# Patient Record
Sex: MALE | Race: OTHER | Employment: UNEMPLOYED | URBAN - METROPOLITAN AREA
[De-identification: names, ages, dates, MRNs, and addresses within clinical notes are randomized per-mention and may not be internally consistent; named-entity substitution may affect disease eponyms.]

---

## 2019-04-11 ENCOUNTER — OFFICE VISIT (OUTPATIENT)
Dept: FAMILY MEDICINE CLINIC | Age: 34
End: 2019-04-11
Payer: COMMERCIAL

## 2019-04-11 VITALS
BODY MASS INDEX: 30.69 KG/M2 | HEART RATE: 78 BPM | SYSTOLIC BLOOD PRESSURE: 118 MMHG | DIASTOLIC BLOOD PRESSURE: 80 MMHG | HEIGHT: 71 IN | WEIGHT: 219.2 LBS

## 2019-04-11 DIAGNOSIS — R10.11 CHRONIC RUQ PAIN: Primary | ICD-10-CM

## 2019-04-11 DIAGNOSIS — E78.1 HYPERTRIGLYCERIDEMIA: ICD-10-CM

## 2019-04-11 DIAGNOSIS — G89.29 CHRONIC RUQ PAIN: Primary | ICD-10-CM

## 2019-04-11 PROCEDURE — G8417 CALC BMI ABV UP PARAM F/U: HCPCS | Performed by: FAMILY MEDICINE

## 2019-04-11 PROCEDURE — 1036F TOBACCO NON-USER: CPT | Performed by: FAMILY MEDICINE

## 2019-04-11 PROCEDURE — 99213 OFFICE O/P EST LOW 20 MIN: CPT | Performed by: FAMILY MEDICINE

## 2019-04-11 PROCEDURE — G8427 DOCREV CUR MEDS BY ELIG CLIN: HCPCS | Performed by: FAMILY MEDICINE

## 2019-04-11 ASSESSMENT — ENCOUNTER SYMPTOMS
DIARRHEA: 0
RESPIRATORY NEGATIVE: 1
CONSTIPATION: 0
BLOOD IN STOOL: 0
ABDOMINAL PAIN: 1

## 2019-04-11 ASSESSMENT — PATIENT HEALTH QUESTIONNAIRE - PHQ9
SUM OF ALL RESPONSES TO PHQ9 QUESTIONS 1 & 2: 2
1. LITTLE INTEREST OR PLEASURE IN DOING THINGS: 1
SUM OF ALL RESPONSES TO PHQ QUESTIONS 1-9: 2
2. FEELING DOWN, DEPRESSED OR HOPELESS: 1
SUM OF ALL RESPONSES TO PHQ QUESTIONS 1-9: 2

## 2019-04-11 NOTE — PROGRESS NOTES
Patient ID: Jeannette Enamorado 1985    . Chief Complaint   Patient presents with    Abdominal Pain     RUQ 8/10    Blood Sugar Problem     was told pre diabetes in 2016         Abdominal Pain   This is a chronic problem. The current episode started more than 1 year ago. The onset quality is gradual. The problem occurs every several days. The problem has been gradually worsening. The pain is located in the RUQ. The pain is moderate. The abdominal pain does not radiate. Pertinent negatives include no constipation or diarrhea. Exacerbated by: twisting to left --does this a lot at work. Treatments tried: went to ER and had negative work up. Prediabetes: here for f/u. Review of Systems   Respiratory: Negative. Gastrointestinal: Positive for abdominal pain. Negative for blood in stool, constipation and diarrhea. Patient Active Problem List   Diagnosis    High triglycerides       No past medical history on file. No past surgical history on file. Family History   Problem Relation Age of Onset    High Cholesterol Father     Hypertension Father     Seizures Father     Cancer Other     Diabetes Other     High Cholesterol Other     Hypertension Other        No current outpatient medications on file prior to visit. No current facility-administered medications on file prior to visit. Objective:     Physical Exam   Constitutional: He appears well-developed and well-nourished. No distress. HENT:   Head: Normocephalic and atraumatic. Right Ear: Hearing, tympanic membrane and external ear normal.   Left Ear: Hearing, tympanic membrane and external ear normal.   Mouth/Throat: Oropharynx is clear and moist and mucous membranes are normal. No oropharyngeal exudate, posterior oropharyngeal edema, posterior oropharyngeal erythema or tonsillar abscesses. Neck: No tracheal deviation present. No thyromegaly present.    Cardiovascular: Normal rate, regular rhythm, S1 normal, S2 normal and normal heart sounds. Exam reveals no gallop. No murmur heard. Pulmonary/Chest: Effort normal and breath sounds normal. No respiratory distress. He has no wheezes. He has no rales. Abdominal: Soft. He exhibits no mass. There is no tenderness. Musculoskeletal: He exhibits no edema. Lymphadenopathy:        Head (right side): No submental, no submandibular and no posterior auricular adenopathy present. Head (left side): No submental, no submandibular and no posterior auricular adenopathy present. Right cervical: No superficial cervical, no deep cervical and no posterior cervical adenopathy present. Left cervical: No superficial cervical, no deep cervical and no posterior cervical adenopathy present. Neurological: He is alert. Skin: Skin is warm, dry and intact. Psychiatric: He has a normal mood and affect. His speech is normal and behavior is normal. Cognition and memory are normal.   Nursing note and vitals reviewed. Vitals:    04/11/19 1042   BP: 118/80   Site: Right Upper Arm   Position: Sitting   Cuff Size: Large Adult   Pulse: 78   Weight: 219 lb 3.2 oz (99.4 kg)   Height: 5' 11\" (1.803 m)     Body mass index is 30.57 kg/m². Wt Readings from Last 3 Encounters:   04/11/19 219 lb 3.2 oz (99.4 kg)   11/02/16 225 lb (102.1 kg)   10/25/16 224 lb 3.2 oz (101.7 kg)     BP Readings from Last 3 Encounters:   04/11/19 118/80   11/02/16 133/89   10/25/16 126/84          No results found for this visit on 04/11/19. The ASCVD Risk score (Hannah Smart et al., 2013) failed to calculate for the following reasons: The 2013 ASCVD risk score is only valid for ages 36 to 78  Lab Review   No visits with results within 6 Month(s) from this visit.    Latest known visit with results is:   Admission on 11/02/2016, Discharged on 11/02/2016   Component Date Value    WBC 11/02/2016 8.5     RBC 11/02/2016 5.41     Hemoglobin 11/02/2016 15.9     Hematocrit 11/02/2016 45.6     MCV 11/02/2016 84.3     MCH 11/02/2016 29.4     MCHC 11/02/2016 34.9     RDW 11/02/2016 12.2     Platelets 09/60/4620 191     MPV 11/02/2016 11.0     Differential Type 11/02/2016 AUTOMATED DIFFERENTIAL     Segs Relative 11/02/2016 61.7     Lymphocytes % 11/02/2016 29.9     Monocytes % 11/02/2016 6.9*    Eosinophils % 11/02/2016 0.7     Basophils % 11/02/2016 0.4     Segs Absolute 11/02/2016 5.3     Lymphocytes # 11/02/2016 2.6     Monocytes # 11/02/2016 0.6     Eosinophils # 11/02/2016 0.1     Basophils # 11/02/2016 0.0     Immature Neutrophil % 11/02/2016 0.4     Total Immature Neutrophil 11/02/2016 0.03     Sodium 11/02/2016 140     Potassium 11/02/2016 4.1     Chloride 11/02/2016 102     CO2 11/02/2016 27     BUN 11/02/2016 13     CREATININE 11/02/2016 1.0     Glucose, Fasting 11/02/2016 87     Calcium 11/02/2016 9.8     Alb 11/02/2016 4.8     Total Protein 11/02/2016 7.5     Total Bilirubin 11/02/2016 0.6     ALT 11/02/2016 83*    AST 11/02/2016 35     Alkaline Phosphatase 11/02/2016 116     GFR Non- 11/02/2016 >60     GFR  11/02/2016 >60     Anion Gap 11/02/2016 11     Lipase 11/02/2016 40     Color, UA 11/02/2016 YELLOW     Clarity, UA 11/02/2016 CLEAR     Glucose, Urine 11/02/2016 NEGATIVE     Bilirubin Urine 11/02/2016 NEGATIVE     Ketones, Urine 11/02/2016 NEGATIVE     Specific [place], UA 11/02/2016 1.021     Blood, Urine 11/02/2016 NEGATIVE     pH, Urine 11/02/2016 6.0     Protein, UA 11/02/2016 TRACE*    Urobilinogen, Urine 11/02/2016 0.2     Nitrite Urine, Quant[place]* 11/02/2016 NEGATIVE     Leukocyte Esterase, Urine 11/02/2016 NEGATIVE     Volume, (UVOL) 11/02/2016 12     RBC, UA 11/02/2016 0 TO 1     WBC, UA 11/02/2016 NO CELLS SEEN     Epi Cells 11/02/2016 0 TO 1     Cast Type 11/02/2016 NO CAST FORMS SEEN     Bacteria, UA 11/02/2016 RARE*    Crystal Type 11/02/2016 NONE SEEN     Mucus, UA 11/02/2016 NEGATIVE Acuity: Acute   Type of Exam: Initial   Relevant Medical/Surgical History: LLQ pain, h/o diverticulitis, 100 ml   optiray 320       FINDINGS:   Lower Chest: Within normal limit       Organs: Fatty liver.  Liver, spleen, pancreas and adrenal glands are within   normal limits.  Unremarkable gallbladder.       GI/Bowel: No evidence of focal enterocolitis.  No bowel obstruction.  No free   air.       Pelvis: Within normal limit       Peritoneum/Retroperitoneum: Unremarkable kidneys without hydronephrosis.  No   evidence of obstructive uropathy.       Bones/Soft Tissues: Within normal limits.           Impression   1. No acute findings identified in the abdomen and pelvis.         Lab Review   No visits with results within 6 Month(s) from this visit.    Latest known visit with results is:   Admission on 11/02/2016, Discharged on 11/02/2016   Component Date Value    WBC 11/02/2016 8.5     RBC 11/02/2016 5.41     Hemoglobin 11/02/2016 15.9     Hematocrit 11/02/2016 45.6     MCV 11/02/2016 84.3     MCH 11/02/2016 29.4     MCHC 11/02/2016 34.9     RDW 11/02/2016 12.2     Platelets 06/20/1713 191     MPV 11/02/2016 11.0     Differential Type 11/02/2016 AUTOMATED DIFFERENTIAL     Segs Relative 11/02/2016 61.7     Lymphocytes % 11/02/2016 29.9     Monocytes % 11/02/2016 6.9*    Eosinophils % 11/02/2016 0.7     Basophils % 11/02/2016 0.4     Segs Absolute 11/02/2016 5.3     Lymphocytes # 11/02/2016 2.6     Monocytes # 11/02/2016 0.6     Eosinophils # 11/02/2016 0.1     Basophils # 11/02/2016 0.0     Immature Neutrophil % 11/02/2016 0.4     Total Immature Neutrophil 11/02/2016 0.03     Sodium 11/02/2016 140     Potassium 11/02/2016 4.1     Chloride 11/02/2016 102     CO2 11/02/2016 27     BUN 11/02/2016 13     CREATININE 11/02/2016 1.0     Glucose, Fasting 11/02/2016 87     Calcium 11/02/2016 9.8     Alb 11/02/2016 4.8     Total Protein 11/02/2016 7.5     Total Bilirubin 11/02/2016 0.6    

## 2019-04-17 ENCOUNTER — HOSPITAL ENCOUNTER (OUTPATIENT)
Dept: ULTRASOUND IMAGING | Age: 34
Discharge: HOME | End: 2019-04-17
Payer: COMMERCIAL

## 2019-04-17 DIAGNOSIS — G89.29 CHRONIC RUQ PAIN: ICD-10-CM

## 2019-04-17 DIAGNOSIS — E78.1 HYPERTRIGLYCERIDEMIA: ICD-10-CM

## 2019-04-17 DIAGNOSIS — R10.11 CHRONIC RUQ PAIN: ICD-10-CM

## 2019-04-17 LAB
CHOLESTEROL: 185 MG/DL
HDLC SERPL-MCNC: 35 MG/DL
LDL CHOLESTEROL DIRECT: 151 MG/DL
TRIGL SERPL-MCNC: 158 MG/DL

## 2019-04-17 PROCEDURE — 80061 LIPID PANEL: CPT

## 2019-04-17 PROCEDURE — 83721 ASSAY OF BLOOD LIPOPROTEIN: CPT

## 2019-04-17 PROCEDURE — 76705 ECHO EXAM OF ABDOMEN: CPT

## 2019-04-17 PROCEDURE — 36415 COLL VENOUS BLD VENIPUNCTURE: CPT

## 2019-05-02 ENCOUNTER — OFFICE VISIT (OUTPATIENT)
Dept: FAMILY MEDICINE CLINIC | Age: 34
End: 2019-05-02
Payer: COMMERCIAL

## 2019-05-02 VITALS
WEIGHT: 231.8 LBS | DIASTOLIC BLOOD PRESSURE: 76 MMHG | SYSTOLIC BLOOD PRESSURE: 120 MMHG | HEIGHT: 71 IN | BODY MASS INDEX: 32.45 KG/M2 | HEART RATE: 78 BPM

## 2019-05-02 DIAGNOSIS — R10.13 EPIGASTRIC PAIN: ICD-10-CM

## 2019-05-02 DIAGNOSIS — R21 RASH: Primary | ICD-10-CM

## 2019-05-02 DIAGNOSIS — R79.89 ABNORMAL LIVER FUNCTION TEST: ICD-10-CM

## 2019-05-02 PROCEDURE — 99214 OFFICE O/P EST MOD 30 MIN: CPT | Performed by: FAMILY MEDICINE

## 2019-05-02 PROCEDURE — G8417 CALC BMI ABV UP PARAM F/U: HCPCS | Performed by: FAMILY MEDICINE

## 2019-05-02 PROCEDURE — G8427 DOCREV CUR MEDS BY ELIG CLIN: HCPCS | Performed by: FAMILY MEDICINE

## 2019-05-02 PROCEDURE — 1036F TOBACCO NON-USER: CPT | Performed by: FAMILY MEDICINE

## 2019-05-02 RX ORDER — OMEPRAZOLE 40 MG/1
40 CAPSULE, DELAYED RELEASE ORAL
Qty: 30 CAPSULE | Refills: 11 | Status: SHIPPED | OUTPATIENT
Start: 2019-05-02

## 2019-05-02 NOTE — PATIENT INSTRUCTIONS
Patient Education        Patient Education        Gastroesophageal Reflux Disease (GERD): Care Instructions  Your Care Instructions    Gastroesophageal reflux disease (GERD) is the backward flow of stomach acid into the esophagus. The esophagus is the tube that leads from your throat to your stomach. A one-way valve prevents the stomach acid from moving up into this tube. When you have GERD, this valve does not close tightly enough. If you have mild GERD symptoms including heartburn, you may be able to control the problem with antacids or over-the-counter medicine. Changing your diet, losing weight, and making other lifestyle changes can also help reduce symptoms. Follow-up care is a key part of your treatment and safety. Be sure to make and go to all appointments, and call your doctor if you are having problems. It's also a good idea to know your test results and keep a list of the medicines you take. How can you care for yourself at home? · Take your medicines exactly as prescribed. Call your doctor if you think you are having a problem with your medicine. · Your doctor may recommend over-the-counter medicine. For mild or occasional indigestion, antacids, such as Tums, Gaviscon, Mylanta, or Maalox, may help. Your doctor also may recommend over-the-counter acid reducers, such as Pepcid AC, Tagamet HB, Zantac 75, or Prilosec. Read and follow all instructions on the label. If you use these medicines often, talk with your doctor. · Change your eating habits. ? It's best to eat several small meals instead of two or three large meals. ? After you eat, wait 2 to 3 hours before you lie down. ? Chocolate, mint, and alcohol can make GERD worse. ? Spicy foods, foods that have a lot of acid (like tomatoes and oranges), and coffee can make GERD symptoms worse in some people. If your symptoms are worse after you eat a certain food, you may want to stop eating that food to see if your symptoms get better.   · Do not up your breakfast.  ? Add sliced fruit or frozen berries to your yogurt, pancakes, or cereal.  ? Blend fresh or frozen fruit, veggies, and yogurt with a little fruit juice, and you've got a tasty smoothie. ? Make your scrambled eggs a gourmet treat by adding onions, celery, and bell peppers. ? Bake up some bran muffins with grated carrots added into the mix. · Make a livelier lunch. ? Jazz up tuna or chicken salad with apple chunks, celery, or grapes--or all of them! ? Add cucumbers, avocado slices, tomatoes, and lettuce to your sandwiches. ? Kick up the flavor of grilled cheese sandwiches with spinach and tomatoes. ? Puree some potatoes or squash to add to tomato soup. · Add delicious veggies to dinner. ? Give more color and taste to salads. Stir in red cabbage, carrots, and bell peppers. Top salads with dried cranberries or raisins. \"Frost\" your salad with orange sections or strawberries. ? Keep a bag or two of frozen vegetables ready to pull out of the freezer for a side dish. ? Spice up spaghetti and meatballs with mushrooms and bell peppers. ? Roast vegetables like cauliflower or squash in the oven with olive oil to bring out their flavor. ? Season your veggie dish with herbs like basil and mumtaz and a splash of lemon juice and olive oil. ? If you've got a main dish in the oven, stick in a potato to round out your meal.  · Grab some healthy snacks on the go. ? Scoop up an apple, banana, or plum for a quick snack. ? Cut up raw fruits and veggies to keep in your fridge. Grapes, oranges, carrots, and celery are great choices. They'll be ready for a quick snack or an after-school treat. ? Dip raw vegetables in hummus or peanut butter. ? Keep dried fruit on hand for an easy \"take with you\" snack. · Make something sweet--and healthy. ? Try baked apples or pears topped with cinnamon and honey for a delicious dessert.   ? Make chocolate chip cookies even better with grated carrots added to the mix.  Where can you learn more? Go to https://[email or web address].[email or web address] and sign in to your [employer] account. Enter F050 in the [employer] box to learn more about \"Learning About Eating More Fruits and Vegetables. \"     If you do not have an account, please click on the \"Sign Up Now\" link. Current as of: March 28, 2018  Content Version: 11.9  © 9007-1612 [employer]. Care instructions adapted under license by [facility] ([facility]). If you have questions about a medical condition or this instruction, always ask your healthcare professional. [address] any warranty or liability for your use of this information.

## 2019-05-03 ASSESSMENT — ENCOUNTER SYMPTOMS
ABDOMINAL PAIN: 1
DIARRHEA: 0
CONSTIPATION: 0

## 2019-05-03 NOTE — PROGRESS NOTES
Patient ID: Rasta Winkler 1985    Chief Complaint   Patient presents with    Abdominal Pain    Rash     rash red and itching left arm          Abdominal Pain   This is a chronic problem. The current episode started more than 1 year ago. The onset quality is gradual. The problem occurs every several days. The problem has been gradually improving. The pain is located in the RUQ. The pain is moderate. The abdominal pain does not radiate. Pertinent negatives include no constipation or diarrhea. Exacerbated by: certain foods. Treatments tried: went to ER and had negative work up. Rash   This is a new problem. The current episode started today. Location: both arms. 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 Pertinent negatives include no diarrhea. Review of Systems   Constitutional: Negative for activity change and unexpected weight change. Gastrointestinal: Positive for abdominal pain. Negative for constipation and diarrhea. Skin: Positive for rash. Patient Active Problem List   Diagnosis    High triglycerides       No past medical history on file. No past surgical history on file. Family History   Problem Relation Age of Onset    High Cholesterol Father     Hypertension Father     Seizures Father     Cancer Other     Diabetes Other     High Cholesterol Other     Hypertension Other        No current outpatient medications on file prior to visit. No current facility-administered medications on file prior to visit. Objective:     Physical Exam   Constitutional: He appears well-developed and well-nourished. HENT:   Head: Normocephalic and atraumatic. Cardiovascular: Normal rate, regular rhythm, S1 normal, S2 normal and normal heart sounds. Pulmonary/Chest: Effort normal and breath sounds normal. No respiratory distress. He has no wheezes. He has no rales. Abdominal: Soft. He exhibits no mass. There is no tenderness.    Neurological: He is alert.   Skin: Skin is warm, dry and intact. Rash noted. No jaundice, not icteric   Psychiatric: He has a normal mood and affect. His speech is normal and behavior is normal. Cognition and memory are normal.   Nursing note and vitals reviewed. Vitals:    05/02/19 1014   BP: 120/76   Site: Right Upper Arm   Position: Sitting   Cuff Size: Large Adult   Pulse: 78   Weight: 231 lb 12.8 oz (105.1 kg)   Height: 5' 11\" (1.803 m)     Body mass index is 32.33 kg/m². Wt Readings from Last 3 Encounters:   05/02/19 231 lb 12.8 oz (105.1 kg)   04/11/19 219 lb 3.2 oz (99.4 kg)   11/02/16 225 lb (102.1 kg)     BP Readings from Last 3 Encounters:   05/02/19 120/76   04/11/19 118/80   11/02/16 133/89          No results found for this visit on 05/02/19. Lab Review   Hospital Outpatient Visit on 04/17/2019   Component Date Value    Triglycerides 04/17/2019 158*    Cholesterol 04/17/2019 185     HDL 04/17/2019 35*    LDL Direct 04/17/2019 151*         Assessment:       Diagnosis Orders   1. Rash  hydrocortisone (WESTCORT) 0.2 % cream   2. Epigastric pain     3. Abnormal liver function test  KRISTA    AST    Ferritin    Hepatitis Panel, Acute           Plan:      See orders    Avoid Tylenol and alcohol    Epigastric pain could've been from the foods he is eating. Recommended to avoid greasy foods and fast foods. Recheck liver functions in a few weeks.

## 2021-07-20 ENCOUNTER — HOSPITAL ENCOUNTER (EMERGENCY)
Age: 36
Discharge: HOME | End: 2021-07-20

## 2021-07-20 VITALS
HEIGHT: 71 IN | SYSTOLIC BLOOD PRESSURE: 164 MMHG | WEIGHT: 230 LBS | HEART RATE: 96 BPM | TEMPERATURE: 98 F | DIASTOLIC BLOOD PRESSURE: 97 MMHG | RESPIRATION RATE: 17 BRPM | BODY MASS INDEX: 32.2 KG/M2 | OXYGEN SATURATION: 97 %

## 2021-07-20 DIAGNOSIS — K04.7 DENTAL INFECTION: Primary | ICD-10-CM

## 2021-07-20 PROCEDURE — 6370000000 HC RX 637 (ALT 250 FOR IP): Performed by: PHYSICIAN ASSISTANT

## 2021-07-20 PROCEDURE — 99283 EMERGENCY DEPT VISIT LOW MDM: CPT

## 2021-07-20 RX ORDER — PENICILLIN V POTASSIUM 500 MG/1
500 TABLET ORAL ONCE
Status: COMPLETED | OUTPATIENT
Start: 2021-07-20 | End: 2021-07-20

## 2021-07-20 RX ORDER — PENICILLIN V POTASSIUM 500 MG/1
500 TABLET ORAL 4 TIMES DAILY
Qty: 28 TABLET | Refills: 0 | Status: SHIPPED | OUTPATIENT
Start: 2021-07-20 | End: 2021-07-30

## 2021-07-20 RX ADMIN — PENICILLIN V POTASSIUM 500 MG: 500 TABLET, FILM COATED ORAL at 14:19

## 2021-07-20 ASSESSMENT — PAIN SCALES - GENERAL: PAINLEVEL_OUTOF10: 7

## 2021-07-20 NOTE — ED PROVIDER NOTES
Triage Chief Complaint:   Otalgia (left x 3 days)    [place]:  Guicho Adam is a 39 y.o. male that presents  today complaining of dental pain left-sided. Radiating into the left ear. .  Pain is ranked 068/10. Made worse with mastication, palpation. Pain has been ongoing times 2days. ROS:  General:  No fevers  Eyes:  no discharge  ENT:  No sore throat, no nasal congestion, no hearing changes, + dental pain  Cardiovascular:  No chest pain  Respiratory:  no cough  Gastrointestinal:  no nausea, no vomiting  Musculoskeletal:   no joint pain  Skin:  No rash  Neurologic:  No speech problems, no headache  Genitourinary:  No dysuria    History reviewed. No pertinent past medical history. History reviewed. No pertinent surgical history. Family History   Problem Relation Age of Onset    High Cholesterol Father     Hypertension Father     Seizures Father     Cancer Other     Diabetes Other     High Cholesterol Other     Hypertension Other      Social History     Socioeconomic History    Marital status: Single     Spouse name: Not on file    Number of children: 1    Years of education: Not on file    Highest education level: Not on file   Occupational History    Occupation: Welding   Tobacco Use    Smoking status: Former Smoker     Packs/day: 0.50     Types: Cigarettes    Smokeless tobacco: Never Used   Substance and Sexual Activity    Alcohol use: Yes     Alcohol/week: 1.0 - 2.0 standard drinks     Types: 1 - 2 Cans of beer per week     Comment: daily    Drug use: No    Sexual activity: Yes     Partners: Female   Other Topics Concern    Not on file   Social History Narrative    Not on file     Social Determinants of Health     Financial Resource Strain:     Difficulty of Paying Living Expenses:    Food Insecurity:     Worried About Running Out of Food in the Last Year:     [address] in the Last Year:    Transportation Needs:     Lack of Transportation (Medical):      Lack of Transportation (Non-Medical):    Physical Activity:     Days of Exercise per Week:     Minutes of Exercise per Session:    Stress:     Feeling of Stress :    Social Connections:     Frequency of Communication with Friends and Family:     Frequency of Social Gatherings with Friends and Family:     Attends Rastafarian Services:     Active Member of Clubs or Organizations:     Attends Club or Organization Meetings:     Marital Status:    Intimate Partner Violence:     Fear of Current or Ex-Partner:     Emotionally Abused:     Physically Abused:     Sexually Abused:      Current Facility-Administered Medications   Medication Dose Route Frequency Provider Last Rate Last Admin    penicillin v potassium (VEETID) tablet 500 mg  500 mg Oral Once Yenny Kingsley PA-C         Current Outpatient Medications   Medication Sig Dispense Refill    omeprazole (PRILOSEC) 40 MG delayed release capsule Take 1 capsule by mouth every morning (before breakfast) 30 capsule 11    hydrocortisone (WESTCORT) 0.2 % cream Apply topically 2 times daily 15 g 0     No Known Allergies    Nursing Notes Reviewed    Physical Exam:  ED Triage Vitals [07/20/21 1157]   Enc Vitals Group      BP (!) 164/97      Pulse 96      Resp 17      Temp 98 °F (36.7 °C)      Temp Source Oral      SpO2 97 %      Weight 230 lb (104.3 kg)      Height 5' 11\" (1.803 m)      Head Circumference       Peak Flow       Pain Score       Pain Loc       Pain Edu? Excl. in [address]? General appearance:  No acute distress. Skin:  Warm. Dry. Eye:  Extraocular movements intact. Ears, nose, mouth and throat:  Oral mucosa moist, no edema under the tongue, posterior pharynx without erythema, exudate or edema, no gumline erythema noted, no abscess, dental caries noted. Bilateral tympanic membranes not injected. Intact. Clear middle ear spaces clear external auditory canals with no tragal tenderness bilateral  Neck:  Trachea midline.   No tender palpable cervical lymphadenopathy  Extremity:  Normal ROM     Respiratory:  Respirations nonlabored. Neurological:  Alert and oriented    I have reviewed and interpreted all of the currently available lab results from this visit (if applicable):  No results found for this visit on 07/20/21. Radiographs (if obtained):  [] The following radiograph was interpreted by myself in the absence of a radiologist:   [] Radiologist's Report Reviewed:  No orders to display         EKG (if obtained): (All EKG's are interpreted by myself in the absence of a cardiologist)    Chart review shows recent radiographs:  No results found. MDM:  Patient presenting for dental pain, no abscess, no Rachna's angina, has multiple chronic dental caries, will be given antibiotics, pain medications, and dental clinic/office list provided. I stressed the need for dental followup as emergency department treatment is not the definitive treatment if choice. Pt is to be discharged home. Pt is  to return immediately to the emergency department if he has any new, worrisome or worsening symptoms. Pt is to follow up with PCP within 2 days. Patient/Surrogate vocalizes agreement and understanding with this plan and he has no questions upon disposition. Pt is comfortable upon disposition home. Patient is stable, Patients vital signs are stable. I estimate there is LOW risk for DEEP SPACE INFECTION (e.g., RACHNAS ANGINA OR RETROPHARYNGEAL ABSCESS), BACTERIAL MENINGITIS, or AIRWAY COMPROMISE, thus I consider the discharge disposition reasonable. rn.  BP (!) 164/97   Pulse 96   Temp 98 °F (36.7 °C) (Oral)   Resp 17   Ht 5' 11\" (1.803 m)   Wt 230 lb (104.3 kg)   SpO2 97%   BMI 32.08 kg/m²   Vital signs and nursing notes reviewed during ED course. I independently managed patient today in the ED. All pertinent Lab data and radiographic results reviewed with patient at bedside.    The patient and/or the family were informed of the results of any tests/labs/imaging, the treatment plan, and time was allotted to answer questions. See chart for details of medications given during the ED stay. Clinical Impression:  1. Dental infection      Disposition referral (if applicable):  [facility] Emergency Department  [address]  [phone]    If symptoms worsen or persist    Disposition medications (if applicable):  New Prescriptions    No medications on file       Comment: Please note this report has been produced using speech recognition software and may contain errors related to that system including errors in grammar, punctuation, and spelling, as well as words and phrases that may be inappropriate. If there are any questions or concerns please feel free to contact the dictating provider for clarification.         Calvin Rooney PA-C  07/20/21 9329

## 2023-03-09 ENCOUNTER — HOSPITAL ENCOUNTER (OUTPATIENT)
Dept: PSYCHIATRY | Age: 38
Setting detail: THERAPIES SERIES
Discharge: HOME | End: 2023-03-09
Payer: COMMERCIAL

## 2023-03-09 PROCEDURE — 80305 DRUG TEST PRSMV DIR OPT OBS: CPT

## 2023-03-09 PROCEDURE — 90791 PSYCH DIAGNOSTIC EVALUATION: CPT

## 2023-03-09 ASSESSMENT — ANXIETY QUESTIONNAIRES
3. WORRYING TOO MUCH ABOUT DIFFERENT THINGS: 0
GAD7 TOTAL SCORE: 0
2. NOT BEING ABLE TO STOP OR CONTROL WORRYING: 0
7. FEELING AFRAID AS IF SOMETHING AWFUL MIGHT HAPPEN: 0
4. TROUBLE RELAXING: 0
5. BEING SO RESTLESS THAT IT IS HARD TO SIT STILL: 0
6. BECOMING EASILY ANNOYED OR IRRITABLE: 0
IF YOU CHECKED OFF ANY PROBLEMS ON THIS QUESTIONNAIRE, HOW DIFFICULT HAVE THESE PROBLEMS MADE IT FOR YOU TO DO YOUR WORK, TAKE CARE OF THINGS AT HOME, OR GET ALONG WITH OTHER PEOPLE: NOT DIFFICULT AT ALL
1. FEELING NERVOUS, ANXIOUS, OR ON EDGE: 0

## 2023-03-09 NOTE — PROGRESS NOTES
[facility]                     CLINICAL DIAGNOSIS SUMMARY    Location: [x] [place] [] [place]                   Patient Name: Nnamdi Alas   : 1985     Case # :  [id number]  Therapist: Gerardo Marsh [facility]-      Identifying information:  Nnamdi Alas / 1985             Nnamdi Romero is a 37 year old  male: never : son age 17 from previous relationship: son reside with mother: involved with [facility]; : Justa Batista: charged with SHELLEY: indicated he was sentenced 3 days in senior care, pending scheduled to complete the Weekend Intervention Program next week: 2023: license suspended: furthermore indicated prior involvement with [address]: SHELLEY: 2023: other prior charges: Driving Under Suspension: : Driving without valid License: Burglary: dismissed:       2.  Substance use history:        F10.20 Alcohol Use Disorder      Denies any drug use historically: last consumed alcohol: 2023: onset of consuming alcohol: include beer and liquor: onset age 20 until ae 37: indicated his drinking has remained consistent: frequency consistent: reporting occasional drinking: one weekends: monthly: unsure of amounts, reporting it would vary.         Use in larger amounts or longer periods of time than intended: report several occasions drinking more than intended       Failure to Fulfill major role obligations      Work: Occupational: missed time at work: called off work due to legal charges, using work money due to court concerns and SHELLEY       Legal: charged with SHELLEY: indicated he was sentenced 3 days in senior care, pending scheduled to complete the Weekend Intervention Program next week: 2023: license suspended: furthermore, indicated prior involvement with [address]: SHELLEY: 2023: other prior charges: Driving Under Suspension: 2022,  February 2022: Driving without valid License: Stone: dismissed. Financial: loss income due to court, , [facility] program, fines, fees, probation, license and insurance consequences       Leisure: events, activities: Social: several social events involve alcohol   Continued use despite social/ interpersonal problems exacerbated or intensified by use or by alcohol: previous SHELLEY: continued to drink and drive         Recurrent use in Physically hazardous situations: SHELLEY x 2       Tolerance: increased 6 beers         3. Consequences of substance use: (personal, inter-personal, legal, occupational, medical, nutritional,       Leisure, spiritual, etc.)                 Legal: charged with SHELLEY: indicated he was sentenced 3 days in intermediate, pending scheduled to complete the Weekend Intervention Program next week: March 16, 8921: license suspended: furthermore, indicated prior involvement with Mariesla Burn: SHELLEY: February 2023: other prior charges: Driving Under Suspension: August 2022, February 2022: Driving without valid License: Stone: dismissed. 4. Co-existing problems;  (mental health, psychiatric, previous treatment programs, family       Problems, social, educational, etc.)           Previous treatment: Weekend Intervention Program: plans to attend another [place] next week. 5. Treatment needs, barriers to treatment, impact of disease on life:           License Suspended         Summary of Medical History:  Prior to Admission medications    Medication Sig Start Date End Date Taking? Authorizing Provider   omeprazole (PRILOSEC) 40 MG delayed release capsule Take 1 capsule by mouth every morning (before breakfast) 5/2/19   Jono Franco MD   hydrocortisone (WESTCORT) 0.2 % cream Apply topically 2 times daily 5/2/19   Jono Franco MD     No past surgical history on file. No past medical history on file. Patient Active Problem List    Diagnosis Date Noted    High triglycerides 10/26/2016       6. Level of Care Determination:          Level  I        7.  Treatment available      __X__yes   _____no               8.  Name of program referred:    __X__[facility],    ____[facility],       _______other/identify           Electronically signed by Sudhakar Higgins [facility] on 6/6/0397 at 2:26 PM

## 2023-03-09 NOTE — PROGRESS NOTES
[facility]        Individual  Progress Note    Location: [x] [place] [] [place]                   Patient Name: Chuy Whiteside   : 1985     Case # : [id number]  Therapist: MARCIAL Robles      1  hour      S: Patricia Ewing is a 44year old  male: never : son age 16 from previous relationship: son reside with mother: involved with [facility]; : Danilo Ventura: charged with SHELLEY: indicated he was sentenced 3 days in care home, pending scheduled to complete the Weekend Intervention Program next week: 7645: license suspended: furthermore indicated prior involvement with Shannan Tae: SHELLEY: 2023: other prior charges: Driving Under Suspension: : Driving without valid License: Burglary: dismissed. O: Denies any homicidal and suicidal ideation: denies any psychosis, oriented x 4         A: Collected urine drug screen, initiated psychosocial assessment, and other pertinent and vital documentation essential for client to engage services. Addictive Behavior, SSRS suicide screening, Addictive services, Spiritual screening, Health History, Provided client documentation for resources. Trauma history, Behavior Screening, Mental status, Alcohol screening and Drug screening. P: Plan to review urine drug screen, complete progress report, finalize remaining psychosocial assessment, complete treatment plan and establish adequate level of care and recommendations.              Electronically signed by MARCIAL Robles on 778 at 3:05 PM         Tayo Cruz LSW, MARCIAL

## 2023-03-09 NOTE — PROGRESS NOTES
[address] [place] [address] Urinalysis Laboratory Testing and Medical History Physician Order       Location: [x] [place] [] Lulu Villela MD., [id number], Medical Director of [facility] Director orders for [address] [place] [address] clinical therapists to collect an urine sample from the below patient,  and medical order for placement in level of care documented on  [facility] 157 scanned order. Client: Kajal Lutz   : 1985   Case# [id number]    Urine sample will be collected following the collections guidelines provided on [facility]) custody form, and completion of the [facility] Non-Federal chain of custody drug screening form. During the course of treatment, randomly a urine sample will be collected, at a minimum of one time a month, more frequently as needed, as part of the clinical outpatient alcohol and drug treatment program at [address] [place] [address]. Medical care recommendation for clients experiencing/reporting  medical concerns, who do not have a family physician and willing to attend  medical care will be assisted in seeking medical care. Clinical providers will refer clients to local family physicians practices as part of the  clients treatment plan and assist the client in gaining access to an appointment. Release of information will be requested to support the  clients seeking medical care. Summary of Medical History  Prior to Admission medications    Medication Sig Start Date End Date Taking? Authorizing Provider   omeprazole (PRILOSEC) 40 MG delayed release capsule Take 1 capsule by mouth every morning (before breakfast) 19   Nickie Stein MD   hydrocortisone (WESTCORT) 0.2 % cream Apply topically 2 times daily 19   Nickie Stein MD     No past surgical history on file. No past medical history on file.   Patient Active Problem List    Diagnosis Date Noted    High triglycerides 10/26/2016       Viola Pack, LICDC-CS  6/3/6780/3:35 PM

## 2023-03-16 ENCOUNTER — HOSPITAL ENCOUNTER (OUTPATIENT)
Dept: PSYCHIATRY | Age: 38
Setting detail: THERAPIES SERIES
Discharge: HOME | End: 2023-03-16
Payer: COMMERCIAL

## 2023-03-16 PROCEDURE — 90834 PSYTX W PT 45 MINUTES: CPT

## 2023-03-16 NOTE — PROGRESS NOTES
Tammy Almazan / 1985 has participated in the treatment plan development outlined above on 3/16/2023.      Josephine Cope [facility]  2/33/7832/16:97 PM

## 2023-03-16 NOTE — PROGRESS NOTES
[facility]        Individual  Progress Note    Location: [x] [place] [] [place]                   Patient Name: Frandy hTomson   : 1985     Case # :  [id number]  Therapist: MARCIAL Clarke      1 hour        Heather Lynne is a 44year old  male: never : son age 16 from previous relationship: son reside with mother: involved with [facility]; : Lanette Cooper: charged with SHELLEY: indicated he was sentenced 3 days in longterm, pending scheduled to complete the Weekend Intervention Program next week: 8105: license suspended: furthermore indicated prior involvement with Vishal Fore: SHELLEY: 2023: other prior charges: Driving Under Suspension: 2022, 2022: Driving without valid License: Stone: dismissed. Av Banda arrived, obtained driving privileges: stressed about financial issues, stressed about court pending 2023: expressed motivated about being sober. O: Denies any homicidal and suicidal ideation: denies any psychosis, oriented x 4         A: Reviewed urine drug screen, completed remaining  psychosocial assessment, complete progress note, treatment plan and other pertinent and vital documentation essential for client to engage services. P: recommendations: consist of Individual sessions and outpatient group.          Electronically signed by MARCIAL Clarke on 4757 at 10:52 AM         Tayo Berry, TIAN, MARCIAL

## 2023-03-16 NOTE — PROGRESS NOTES
[facility] TREATMENT PLAN           Location: [x] [place] [] [place]        Treatment plan: Initial          Strengths: verbally motivated, employed, driving  privileges           Weakness/Limitations:  legal stressors            Service/Frequency/Duration: : Individual Session x 1 time weekly x 90 days, Standard Outpatient Group x 1 time weekly x 90 days, Urinalysis one time monthly x 90 days and one time weekly x 90 days A.A /  NA meetings              Diagnosis:          F10.20 Alcohol Use Disorder                           Level of Care:  Standard  Outpatient and Individual sessions         Problem: History of Substance use            Goal: Enhance personalized knowledge and insight associated with mood altering substances x 90 days     Objectives:              1) Remind 2 to 6  detrimental consequences in major life areas regarding substance  use in 90 days Evaluation Date: 6/9 2023 Code: C Continue TBD                   2) Identify 4 to 8 psychological or physiological benefits /  gratitudes due to remaining substance free in 90 days: Evaluation Date: 6/9/2023 Code: C Continue TBD                    3) Identify and explain 2 to 4 explanations about relapse triggers. Explain 2 to 4 things about relapse. Identify 2 to 4 differences and similarities between internal and eternal triggers associated with substance use in 90 days and Evaluation Date:6/9/2023 Code: C Continue TBD                  2.    Problem: Limited knowledge regarding disease concept and substance use. Goal:  Enhance knowledge and understanding regarding disease concept associated with substance use.          Objectives:           1) Complete 2 to 4 assignments regarding biography of substance use, include onset, frequency, tolerance and amounts  and  in 90 days:  Evaluation Date: 6/9/2023 Code: Code: C Continue TBD              2) Complete assignment on difference  between substance abuse, substance dependence and disease concept of substance use in 90 days  Evaluation Date: 6/9/2023 Code: C Continue TBD                 3)  Utilize, if needed case management services provided by [facility] to enhance abstaining from substance use Evaluation Date: 6/09/23 Code: C Continue TBD              3.    Problem: Limited experience and life regarding Relapse x 90 days           Goal: Identify and address the core dynamics and dilemmas that are perpetuating consequences and exacerbate relapses and triggers and in 90 days        Objectives:           1)  Complete and review with therapist at least 2 or more periods of being sober in 90 days Evaluation Date: 6/9/2023 Code: C Continue TBD                2) Enhance 4 to 8 healthy techniques and coping skills to empower a healthy  relapse prevention plan x 90 days  Evaluation Date: 6/9/2023 Code: C Continue TB                 3) Journal, discuss, monitor  3 to 5 physiological, psychological, biological and mental alterations and coping skills of being sober: x 90 days  Evaluation Date: 6/9/2023       Code: C Continue TBD                    Defer: Address legal stipulations                     Discharge Plan/Instructions: Demonstrate constructive motivation to successfully complete outpatient treatment, finalize stipulations for probation and legal system and develop healthy sobriety plan. Krysta Everett / 1985 has participated in the treatment plan development outlined above on 3/16/2023.          MARCIAL Vázquez  8/89/6919/07:83 PM

## 2023-03-23 ENCOUNTER — HOSPITAL ENCOUNTER (OUTPATIENT)
Dept: PSYCHIATRY | Age: 38
Setting detail: THERAPIES SERIES
Discharge: HOME | End: 2023-03-23
Payer: COMMERCIAL

## 2023-03-23 PROCEDURE — 80305 DRUG TEST PRSMV DIR OPT OBS: CPT

## 2023-03-23 PROCEDURE — 90853 GROUP PSYCHOTHERAPY: CPT

## 2023-03-23 PROCEDURE — 90834 PSYTX W PT 45 MINUTES: CPT

## 2023-03-23 NOTE — PROGRESS NOTES
[facility] TREATMENT PLAN           Location: [x] [place] [] [place]        Treatment plan: Initial          Strengths: verbally motivated, employed, driving  privileges           Weakness/Limitations:  legal stressors            Service/Frequency/Duration: : Individual Session x 1 time weekly x 90 days, Standard Outpatient Group x 1 time weekly x 90 days, Urinalysis one time monthly x 90 days and one time weekly x 90 days A.A /  NA meetings              Diagnosis:          F10.20 Alcohol Use Disorder                           Level of Care:  Standard  Outpatient and Individual sessions         Problem: History of Substance use            Goal: Enhance personalized knowledge and insight associated with mood altering substances x 90 days     Objectives:              1) Remind 2 to 6  detrimental consequences in major life areas regarding substance  use in 90 days Evaluation Date: 6/9 2023 Code: C Continue TBD                   2) Identify 4 to 8 psychological or physiological benefits /  gratitudes due to remaining substance free in 90 days: Evaluation Date: 6/9/2023 Code: C Continue TBD                    3) Identify and explain 2 to 4 explanations about relapse triggers. Explain 2 to 4 things about relapse. Identify 2 to 4 differences and similarities between internal and eternal triggers associated with substance use in 90 days and Evaluation Date:6/9/2023 Code: C Continue TBD                  2.    Problem: Limited knowledge regarding disease concept and substance use. Goal:  Enhance knowledge and understanding regarding disease concept associated with substance use.          Objectives:           1) Complete 2 to 4 assignments regarding biography of substance use, include onset, frequency, tolerance and amounts  and  in 90 days:  Evaluation Date: 6/9/2023 Code: Code: C Continue TBD              2) Complete assignment on difference  between substance abuse, substance dependence and disease concept

## 2023-03-23 NOTE — GROUP NOTE
[facility] Group Therapy Note      3/23/2023    Location:  [place]      Clients Presents:     Clients Absent:     Length of session: 1.5 hours    Group Note: OP    Group Type: Co-Ed    New members were welcomed and introduced. Norms and expectations of group were discussed. Content: Counselor facilitated group discussion of family history. Client identified who he/she saw drinking or using in the home. Client shared how this affected the family. [facility] [place]  3/23/2023 11:37 AM    Co-Therapist: N/A      [facility] Individual [facility] Progress Note    Jana Bella  1985  3/23/2023    Notes on Client Progress in Group    Client shared this is his first group. He feels like he is making progress in treatment and has not drank since his SHELLEY. Client reports seeing people drink and smoke weed. His dad used and would get his mom to use. His dad  when he was young. He learned at an early age about negative things due to alcohol and drugs.      [facility] [place]  3/23/2023 11:45 AM    Co-Therapist: N/A

## 2023-03-23 NOTE — PROGRESS NOTES
[facility]        Individual  Progress Note    Location: [x] [place] [] [place]                   Patient Name: Melchor Ayoub   : 1985     Case # :  [id number]  Therapist: MARCIAL Byrd        1 hour        Goal    # 3      Objective   #    3          Marquis Boyce is a 44year old  male: never : son age 16 from previous relationship: son reside with mother: involved with [facility]; : Kelsea Sumner: charged with SHELLEY: indicated he was sentenced 3 days in care home, pending scheduled to complete the Weekend Intervention Program next week: 6303: license suspended: furthermore indicated prior involvement with Theresa Shaw: SHELLEY: 2023: other prior charges: Driving Under Suspension: : Driving without valid License: Stone: dismissed. S: Tad Click still employed: indicate still employed, report some cravings, but expressed being determined not to drink: report he is adjusting well not drinking: however therapist smelled alcohol on his person: indicated his life style mainly focused on drinking.                   O: Denies any homicidal and suicidal ideation: denies any psychosis, oriented x 4               A: collected urine screen, reiterated secondary consequences and importance of abstinence in therapy         P: continue services          Electronically signed by MARCIAL Byrd on  at 7:10 PM       Tayo Marie, TIAN, MARCIAL

## 2023-03-30 ENCOUNTER — HOSPITAL ENCOUNTER (OUTPATIENT)
Dept: PSYCHIATRY | Age: 38
Setting detail: THERAPIES SERIES
Discharge: HOME | End: 2023-03-30
Payer: COMMERCIAL

## 2023-03-30 ENCOUNTER — APPOINTMENT (OUTPATIENT)
Dept: PSYCHIATRY | Age: 38
End: 2023-03-30
Payer: COMMERCIAL

## 2023-03-30 PROCEDURE — 90834 PSYTX W PT 45 MINUTES: CPT

## 2023-03-30 NOTE — GROUP NOTE
[facility] Group Therapy Note      3/30/2023    Location:  [place]      Clients Presents: [id number], 1396, 1404, 1142, 1412, 1393, 1351    Clients Absent: 1411    Length of session: 1.5 hours    Group Note: OP    Group Type: Co-Ed    New members were welcomed and introduced. Norms and expectations of group were discussed. Content: Counselor presented a topic focused discussion on addiction a disease or choice? Client verbalized his/her own belief's on if addiction is a disease or a choice and why, giving examples of his/her own experiences.      [facility]  3/30/2023 11:31 AM    Co-Therapist: N/A      [facility] Individual Group Progress Note    Tiny Jennie  1985  3/30/2023    Notes on Client Progress in Group    Reason for Absence: cancel      [facility]  3/30/2023 11:41 AM    Co-Therapist: N/A

## 2023-03-30 NOTE — PROGRESS NOTES
[address] [facility]        Individual  Progress Note    Location: [x] [place] [] [place]                   Patient Name: Milan Acosta   : 1985     Case # :  [id number]  Therapist: MARCIAL Deng      1 hour        Goal    # 1      Objective   #    3            Gris Newman is a 44year old  male: never : son age 16 from previous relationship: son reside with mother: involved with [facility]; : Veronica Lee: charged with SHELLEY: indicated he was sentenced 3 days in snf, pending scheduled to complete the Weekend Intervention Program next week: 387: license suspended: furthermore indicated prior involvement with Georgie Clarity: SHELLEY: 2023: other prior charges: Driving Under Suspension: 2022, 2022: Driving without valid License: Burglary: dismissed. S: Charley Minor still employed:  expressed history of having problems with being patient, slowing down,especially pertaining to toxic relationship: expressed he primarily involved with women that drink or use: which he mainly is not focused. O: Denies any homicidal and suicidal ideation: denies any psychosis, oriented x 4                 A: reviewed relapse triggers, identify concerns of being alone, justify excuses to use if involved in using relationships; reviewed alternative coping skills.              P: continue services         Electronically signed by MARCIAL Deng on  at 6:53 PM       Laura Shoemaker, GUSTAVO, LSW, MARCIAL

## 2023-03-30 NOTE — PROGRESS NOTES
[facility] TREATMENT PLAN           Location: [x] [place] [] [place]        Treatment plan: Initial          Strengths: verbally motivated, employed, driving  privileges           Weakness/Limitations:  legal stressors            Service/Frequency/Duration: : Individual Session x 1 time weekly x 90 days, Standard Outpatient Group x 1 time weekly x 90 days, Urinalysis one time monthly x 90 days and one time weekly x 90 days A.A /  NA meetings              Diagnosis:          F10.20 Alcohol Use Disorder                           Level of Care:  Standard  Outpatient and Individual sessions         Problem: History of Substance use            Goal: Enhance personalized knowledge and insight associated with mood altering substances x 90 days     Objectives:              1) Remind 2 to 6  detrimental consequences in major life areas regarding substance  use in 90 days Evaluation Date: 6/9 2023 Code: C Continue TBD                   2) Identify 4 to 8 psychological or physiological benefits /  gratitudes due to remaining substance free in 90 days: Evaluation Date: 6/9/2023 Code: C Continue TBD                    3) Identify and explain 2 to 4 explanations about relapse triggers. Explain 2 to 4 things about relapse. Identify 2 to 4 differences and similarities between internal and eternal triggers associated with substance use in 90 days and Evaluation Date:6/9/2023 Code: C Continue TBD       On 3-30-23: Tanna Lee attended session, expressed history of having problems with being patient, slowing down,especially pertaining to toxic relationship: expressed he primarily involved with women that drink or use: which he mainly is not focused. 2.    Problem: Limited knowledge regarding disease concept and substance use. Goal:  Enhance knowledge and understanding regarding disease concept associated with substance use.          Objectives:           1) Complete 2 to 4 assignments regarding biography of

## 2023-03-31 ENCOUNTER — HOSPITAL ENCOUNTER (EMERGENCY)
Age: 38
Discharge: HOME | End: 2023-03-31
Attending: EMERGENCY MEDICINE
Payer: COMMERCIAL

## 2023-03-31 VITALS
SYSTOLIC BLOOD PRESSURE: 133 MMHG | HEART RATE: 83 BPM | BODY MASS INDEX: 32.9 KG/M2 | RESPIRATION RATE: 16 BRPM | WEIGHT: 235 LBS | OXYGEN SATURATION: 98 % | DIASTOLIC BLOOD PRESSURE: 91 MMHG | HEIGHT: 71 IN | TEMPERATURE: 97.1 F

## 2023-03-31 DIAGNOSIS — R10.31 RIGHT GROIN PAIN: Primary | ICD-10-CM

## 2023-03-31 LAB
BILIRUBIN URINE: NEGATIVE MG/DL
BLOOD, URINE: NEGATIVE
CLARITY: CLEAR
COLOR: YELLOW
COMMENT UA: NORMAL
GLUCOSE, URINE: NEGATIVE MG/DL
KETONES, URINE: NEGATIVE MG/DL
LEUKOCYTE ESTERASE, URINE: NEGATIVE
NITRITE URINE, QUANTITATIVE: NEGATIVE
PH, URINE: 5.5 (ref 5–8)
PROTEIN UA: NEGATIVE MG/DL
SPECIFIC GRAVITY UA: >1.03 (ref 1–1.03)
UROBILINOGEN, URINE: 0.2 MG/DL (ref 0.2–1)

## 2023-03-31 PROCEDURE — 99283 EMERGENCY DEPT VISIT LOW MDM: CPT

## 2023-03-31 PROCEDURE — 81003 URINALYSIS AUTO W/O SCOPE: CPT

## 2023-03-31 ASSESSMENT — PAIN DESCRIPTION - ORIENTATION: ORIENTATION: RIGHT

## 2023-03-31 ASSESSMENT — PAIN DESCRIPTION - FREQUENCY: FREQUENCY: CONTINUOUS

## 2023-03-31 ASSESSMENT — PAIN - FUNCTIONAL ASSESSMENT: PAIN_FUNCTIONAL_ASSESSMENT: 0-10

## 2023-03-31 ASSESSMENT — PAIN SCALES - GENERAL: PAINLEVEL_OUTOF10: 8

## 2023-03-31 ASSESSMENT — PAIN DESCRIPTION - DESCRIPTORS: DESCRIPTORS: PRESSURE;DISCOMFORT

## 2023-03-31 ASSESSMENT — PAIN DESCRIPTION - LOCATION: LOCATION: GROIN

## 2023-03-31 NOTE — DISCHARGE INSTRUCTIONS
Return immediately to the emergency department if you experience new or worsening symptoms, abdominal pain, fever, urinary symptoms, or for any other concerns.

## 2023-03-31 NOTE — ED PROVIDER NOTES
Emergency Department Encounter    Patient: Marine Hyde  MRN: [id number]  : 1985  Date of Evaluation: 2023  ED Provider:  Jonathan Bob MD    MDM:    Clinical Impression:  1. Right groin pain          Triage Chief Complaint: Groin Pain (Right side groin pain for couple days denies swelling )      Patient presents with discomfort in region of the right groin. I completed a structured, evidence-based clinical evaluation to screen for acute emergent condition that poses a threat to life or bodily function. Diagnostic studies/Differential diagnosis included: (with independent interpretations \"as interpreted by me\" and tests considered but not performed) patient has no evidence of palpable inguinal hernia. There is no testicular or epididymal discomfort to suggest epididymitis, orchitis, testicular torsion. Patient had no abdominal tenderness palpation. No evidence of appendicitis, bowel obstruction, acute peritonitis. I do not suspect urinary tract infection patient. There are no overlying skin changes to suggest a lesser abscess. Patient in no bony tenderness palpation to suggest fracture or dislocation. Patient declined evaluation other than UA. UA without evidence of urinary tract infection. CT evaluation was offered however patient declined. Patient states that he will attempt to treat supportively and symptoms continue, patient will return for additional evaluation. Medications ordered in the ED:  ED Medication Orders (From admission, onward)      None              PLAN  Disposition: Patient will be discharged with strict return precautions and follow up instructions.   Decision To Discharge 2023 04:39:46 PM     Disposition referral (if applicable):  Vani Bradshaw MD  [address] [facility] [id number] [phone]    In 2 days        Medications prescribed (if applicable):  Discharge Medication List as of 3/31/2023  4:41 PM Pending)         Comment: Please note this report has been produced using speech recognition software and may contain errors related to that system including errors in grammar, punctuation, and spelling, as well as words and phrases that may be inappropriate. Efforts were made to edit the dictations.

## 2023-03-31 NOTE — ED NOTES
Pt declining CT scan at this time states he wants to \"ride out the pain\"  Dr Omkar Zimmerman talking with patient      Jacob Mccoy RN  03/31/23 9477

## 2023-03-31 NOTE — ED NOTES
Discharge instructions given to patient per JOSIAH Bauer RN.  Pt ambulated to exit per scot Wolfe RN  03/31/23 7065

## 2023-04-03 ENCOUNTER — HOSPITAL ENCOUNTER (OUTPATIENT)
Dept: PSYCHIATRY | Age: 38
Setting detail: THERAPIES SERIES
Discharge: HOME | End: 2023-04-03
Payer: COMMERCIAL

## 2023-04-03 PROCEDURE — 90834 PSYTX W PT 45 MINUTES: CPT

## 2023-04-04 NOTE — PROGRESS NOTES
[facility]        Individual  Progress Note    Location: [x] [place] [] Angelique navarro                   Patient Name: Tejal Ojeda   : 1985     Case # :  [id number]  Therapist: MARCIAL Robb           1 hour        Goal    # 1      Objective   #    3            Shara Saeed is a 44year old  male: never : son age 16 from previous relationship: son reside with mother: involved with [facility]; : Ren Haji: charged with SHELLEY: indicated he was sentenced 3 days in [facility], pending scheduled to complete the Weekend Intervention Program next week: 7823: license suspended: furthermore indicated prior involvement with [employer]: SHELLEY: 2023: other prior charges: Driving Under Suspension: : Driving without valid License: Burglary: dismissed. S: Shaan Monroy still employed:   indicated fears of being alone; parents on drugs: forced him and siblings to live in abandoned houses: failed to having food, clothing, multiple evictions: mother would only use if father used: identify his current relationship is toxic, she use and he does not trust her: past brake ups: expressed plans to terminate relationship.            O: Denies any homicidal and suicidal ideation: denies any psychosis, oriented x 4                 A: reviewed internal relapse triggers,             P: continue services       Electronically signed by MARCIAL Robb on 2/3/5874 at 5:22 PM       Tayo Calderon, TIAN, MARCIAL

## 2023-04-04 NOTE — PROGRESS NOTES
[facility] TREATMENT PLAN           Location: [x] [place] [] [place]        Treatment plan: Initial          Strengths: verbally motivated, employed, driving  privileges           Weakness/Limitations:  legal stressors            Service/Frequency/Duration: : Individual Session x 1 time weekly x 90 days, Standard Outpatient Group x 1 time weekly x 90 days, Urinalysis one time monthly x 90 days and one time weekly x 90 days A.A /  NA meetings              Diagnosis:          F10.20 Alcohol Use Disorder                           Level of Care:  Standard  Outpatient and Individual sessions         Problem: History of Substance use            Goal: Enhance personalized knowledge and insight associated with mood altering substances x 90 days     Objectives:              1) Remind 2 to 6  detrimental consequences in major life areas regarding substance  use in 90 days Evaluation Date: 6/9 2023 Code: C Continue TBD                   2) Identify 4 to 8 psychological or physiological benefits /  gratitudes due to remaining substance free in 90 days: Evaluation Date: 6/9/2023 Code: C Continue TBD                    3) Identify and explain 2 to 4 explanations about relapse triggers. Explain 2 to 4 things about relapse. Identify 2 to 4 differences and similarities between internal and eternal triggers associated with substance use in 90 days and Evaluation Date:6/9/2023 Code: C Continue TBD       On 3-30-23: Dean Watts attended session, expressed history of having problems with being patient, slowing down,especially pertaining to toxic relationship: expressed he primarily involved with women that drink or use: which he mainly is not focused. 04/3/23: identify his current relationship is toxic, she use and he does not trust her: past brake ups: expressed plans to terminate relationship. 2.    Problem: Limited knowledge regarding disease concept and substance use.             Goal:  Enhance knowledge and

## 2023-04-06 ENCOUNTER — HOSPITAL ENCOUNTER (OUTPATIENT)
Dept: PSYCHIATRY | Age: 38
Setting detail: THERAPIES SERIES
Discharge: HOME | End: 2023-04-06
Payer: COMMERCIAL

## 2023-04-06 PROCEDURE — 90853 GROUP PSYCHOTHERAPY: CPT

## 2023-04-06 NOTE — GROUP NOTE
612 [facility] Group Therapy Note      4/6/2023    Location:  [place]      Clients Presents: 9315, 1396, 1404, 1412, 1393, 1432    Clients Absent: 1142, 1378    Length of session: 1.5 hours    Group Note: OP    Group Type: Co-Ed    New members were welcomed and introduced. Norms and expectations of group were discussed. Content: Counselor presented a topic focused discussion on communication. Counselor described 4 different types of unhealthy communication styles including escalation, put downs, avoidance and negative interpretations. Client identified the unhealthy communication style they used last and what they could have done differently. [facility]  4/6/2023 11:34 AM    Co-Therapist: N/A      [facility] Individual Group Progress Note    Juani Huizar  1985 4/6/2023    Notes on Client Progress in Group    Client shared he is making progress on his treatment goals. Client denies any stressors. Client reports he avoids all conflict in communication. He shuts down and walks away.      [facility]  4/6/2023 11:45 AM    Co-Therapist: N/A

## 2023-04-17 ENCOUNTER — HOSPITAL ENCOUNTER (OUTPATIENT)
Dept: PSYCHIATRY | Age: 38
Setting detail: THERAPIES SERIES
Discharge: HOME | End: 2023-04-17
Payer: COMMERCIAL

## 2023-04-17 PROCEDURE — 90834 PSYTX W PT 45 MINUTES: CPT

## 2023-04-17 NOTE — PROGRESS NOTES
612 [facility]        Individual  Progress Note    Location: [x] [place] [] [place]                   Patient Name: Demi Pettit   : 1985     Case # :  [id number]  Therapist: MARCIAL Paige        1 hour                 Goal    # 1      Objective   #    2                 Jacki Blizzard is a 44year old  male: never : son age 16 from previous relationship: son reside with mother: involved with [facility]; : Karlos Andrews: charged with SHELLEY: indicated he was sentenced 3 days in [facility], pending scheduled to complete the Weekend Intervention Program next week: 6309: license suspended: furthermore indicated prior involvement with [employer]: SHELLEY: 2023: other prior charges: Driving Under Suspension: 2022, 2022: Driving without valid License: Burglary: dismissed. S: Sisi Griffin still employed:  recognizing the benefits of being sober: more productive at work, save money, improved health,: decreased legal concerns: discussed concerns that his son age 16: smoking marijuana: desire to show son a better example of life : seeking to teach son work ethic.               O: Denies any homicidal and suicidal ideation: denies any psychosis, oriented x 4                 A: reviewed benefits of realizing and recognizing positive thoughts, being optimistic, self motivation               P: continue services           Electronically signed by MARCIAL Paige on  at 12:13 PM       Tayo Doyle 83, LSWMARCIAL
alterations and coping skills of being sober: x 90 days  Evaluation Date: 6/9/2023       Code: On 3-23-23: Indicate still employed, report some cravings, but expressed being determined not to drink: report he is adjusting well not drinking: however therapist smelled alcohol on his person: indicated his life style mainly focused on drinking. Defer: Address legal stipulations                     Discharge Plan/Instructions: Demonstrate constructive motivation to successfully complete outpatient treatment, finalize stipulations for probation and legal system and develop healthy sobriety plan. Nels Dakins / 1985 has participated in the treatment plan development outlined above on 4/17/2023.          Jose Ruby, ALEXIA-PAUL  5/68/9384/65:20 PM

## 2023-04-20 ENCOUNTER — HOSPITAL ENCOUNTER (OUTPATIENT)
Dept: PSYCHIATRY | Age: 38
Setting detail: THERAPIES SERIES
Discharge: HOME | End: 2023-04-20
Payer: COMMERCIAL

## 2023-04-20 PROCEDURE — 90853 GROUP PSYCHOTHERAPY: CPT

## 2023-04-20 NOTE — GROUP NOTE
612 [facility] Group Therapy Note      4/20/2023    Location:  [place] [address]      Clients Presents: 1151, 5669, 1411, 1396, 1404, 1142, 1412, 1393, 1437    Clients Absent: 1378, 1417, 1432    Length of session: 1.5 hours    Group Note: OP    Group Type: Co-Ed    New members were welcomed and introduced. Norms and expectations of group were discussed. Content: Counselor facilitated client check in information and presented a topic focused discussion on progression of substance using behaviors. [facility]  4/20/2023 11:36 AM    Co-Therapist: N/A      [facility] Individual Group Progress Note    Ora Dougherty  1985 4/20/2023    Notes on Client Progress in Group    Client shared he is making progress on his treatment goals. He denies any current stressors. Client denies ever being addicted to any substance. He denies ever over drinking.      [facility]  4/20/2023 11:49 AM    Co-Therapist: N/A

## 2023-04-24 ENCOUNTER — HOSPITAL ENCOUNTER (OUTPATIENT)
Dept: PSYCHIATRY | Age: 38
Setting detail: THERAPIES SERIES
Discharge: HOME | End: 2023-04-24
Payer: COMMERCIAL

## 2023-04-24 PROCEDURE — 90834 PSYTX W PT 45 MINUTES: CPT

## 2023-04-25 NOTE — PROGRESS NOTES
[facility]REAT[facility]           Location: [x] [place] [] Elyn Gosselin        Treatment plan: Initial          Strengths: verbally motivated, employed, driving  privileges           Weakness/Limitations:  legal stressors            Service/Frequency/Duration: : Individual Session x 1 time weekly x 90 days, Standard Outpatient Group x 1 time weekly x 90 days, Urinalysis one time monthly x 90 days and one time weekly x 90 days A.A /  NA meetings              Diagnosis:          F10.20 Alcohol Use Disorder                           Level of Care:  Standard  Outpatient and Individual sessions         Problem: History of Substance use            Goal: Enhance personalized knowledge and insight associated with mood altering substances x 90 days     Objectives:              1) Remind 2 to 6  detrimental consequences in major life areas regarding substance  use in 90 days Evaluation Date: 6/9 2023 Code:       4-10-23: identify legal, social and financial, impact self esteem, consequences: discussed during his childhood: exposed and lacked basic needs: such as food, clothing, shelter,safety: associated with fathers drug use: mother would use when father would return to house: identified stress in relationships: spending money, involved in dysfunctional decisions: maturation issues. 2) Identify 4 to 8 psychological or physiological benefits /  gratitudes due to remaining substance free in 90 days: Evaluation Date: 6/9/2023 Code: C Continue TBD        On 4-17-23: recognizing the benefits of being sober: more productive at work, save money, improved health,: decreased legal concerns: discussed concerns that his son age 16: smoking marijuana: desire to show son a better example of life . 3) Identify and explain 2 to 4 explanations about relapse triggers. Explain 2 to 4 things about relapse.  Identify 2 to 4 differences and similarities between internal and eternal triggers associated with substance

## 2023-04-25 NOTE — PROGRESS NOTES
[address] [facility]        Individual  Progress Note    Location: [x] [place] [] Krystina [place]                   Patient Name: Aleda Area   : 1985     Case # :  [id number]  Therapist: MARCIAL So      1 hour           Goal    # 2      Objective   #    2                 Madai Orozco is a 44year old  male: never : son age 16 from previous relationship: son reside with mother: involved with [facility]; : Ebonie Broderick: charged with SHELLEY: indicated he was sentenced 3 days in [facility], pending scheduled to complete the Weekend Intervention Program next week: 2590: license suspended: furthermore indicated prior involvement with [facility]: SHELLEY: 2023: other prior charges: Driving Under Suspension: 2022, 2022: Driving without valid License: Burglary: dismissed. S: Claudette Ring still employed, sober, discussed substance dependence: identify alcohol: report mainly impacted consequences poor decision making, relationship stressors, drinking until intoxicated, blackouts, legal and tolerance              O: Denies any homicidal and suicidal ideation: denies any psychosis, oriented x 4                 A: reviewed difference between dependence and abuse: secondary consequences.              P: continue services          Electronically signed by MARCIAL So on  at 1:36 PM       Tayo Petersen, LSJESUSITA, MARCIAL

## 2023-04-27 ENCOUNTER — HOSPITAL ENCOUNTER (OUTPATIENT)
Dept: PSYCHIATRY | Age: 38
Setting detail: THERAPIES SERIES
Discharge: HOME | End: 2023-04-27
Payer: COMMERCIAL

## 2023-04-27 NOTE — GROUP NOTE
612 [facility] Group Therapy Note      4/27/2023    Location:  [place]      Clients Presents: 6815, 2315, 1404, 1142, 1412, 1432, 1378, 1437    Clients Absent: 1417, 1424, 1411, 1393    Length of session: 1.5 hours    Group Note: OP    Group Type: Co-Ed    New members were welcomed and introduced. Norms and expectations of group were discussed. Content: Counselor facilitated client check in information focusing on daily stressors and coping skills to avoid relapse.      [facility]  4/27/2023 11:37 AM    Co-Therapist: N/A      [facility] Individual Group Progress Note    Rkia Middleton  1985 4/27/2023    Notes on Client Progress in Group    Reason for Absence: cancel      [facility]  4/27/2023 11:50 AM    Co-Therapist: N/A

## 2023-05-01 ENCOUNTER — HOSPITAL ENCOUNTER (OUTPATIENT)
Dept: PSYCHIATRY | Age: 38
Setting detail: THERAPIES SERIES
Discharge: HOME | End: 2023-05-01
Payer: COMMERCIAL

## 2023-05-01 PROCEDURE — 90834 PSYTX W PT 45 MINUTES: CPT

## 2023-05-01 NOTE — PROGRESS NOTES
612 [facility]        Individual  Progress Note    Location: [x] [place] [] [place]                   Patient Name: Jana Bella   : 1985     Case # :  [id number]  Therapist: MARCIAL Lyon      1 hour           Goal    # 2      Objective   #    1                 Mary Beth Monroy is a 44year old  male: never : son age 16 from previous relationship: son reside with mother: involved with [facility]; : Giuliano Bullard: charged with SHELLEY: indicated he was sentenced 3 days in detention, pending scheduled to complete the Weekend Intervention Program next week: 6596: license suspended: furthermore indicated prior involvement with Eunice Josephine: SHELLEY: 2023: other prior charges: Driving Under Suspension: 2022, 2022: Driving without valid License: Burglary: dismissed. S: Cornelius Centenonick still employed,  denies any drug use historically: last consumed alcohol: 2023: onset of consuming alcohol: include beer and liquor: onset age 23 until ae 44: indicated his drinking has remained consistent: frequency consistent: reporting occasional drinking: one weekends: monthly: unsure of amounts, reporting it would vary. Work: Occupational: missed time at work: called off work due to legal charges, using work money due to court concerns and SHELLEY         Legal: charged with SHELLEY: indicated he was sentenced 3 days in detention, pending scheduled to complete the [address] next week: 6396: license suspended: furthermore, indicated prior involvement with [employer]: SHELLEY: 2023: other prior charges: Driving Under Suspension: : Driving without valid License: Burglary: dismissed.         Financial: loss income due to court, , [address] [facility] program, fines, fees, probation, license and insurance consequences         Leisure: events,

## 2023-05-01 NOTE — PROGRESS NOTES
[facility]REAT[facility] PLAN           Location: [x] [place] [] [place]        Treatment plan: Initial          Strengths: verbally motivated, employed, driving  privileges           Weakness/Limitations:  legal stressors            Service/Frequency/Duration: : Individual Session x 1 time weekly x 90 days, Standard Outpatient Group x 1 time weekly x 90 days, Urinalysis one time monthly x 90 days and one time weekly x 90 days A.A /  NA meetings              Diagnosis:          F10.20 Alcohol Use Disorder                           Level of Care:  Standard  Outpatient and Individual sessions         Problem: History of Substance use            Goal: Enhance personalized knowledge and insight associated with mood altering substances x 90 days     Objectives:              1) Remind 2 to 6  detrimental consequences in major life areas regarding substance  use in 90 days Evaluation Date: 6/9 2023 Code:       4-10-23: identify legal, social and financial, impact self esteem, consequences: discussed during his childhood: exposed and lacked basic needs: such as food, clothing, shelter,safety: associated with fathers drug use: mother would use when father would return to house: identified stress in relationships: spending money, involved in dysfunctional decisions: maturation issues. 2) Identify 4 to 8 psychological or physiological benefits /  gratitudes due to remaining substance free in 90 days: Evaluation Date: 6/9/2023 Code: C Continue TBD        On 4-17-23: recognizing the benefits of being sober: more productive at work, save money, improved health,: decreased legal concerns: discussed concerns that his son age 16: smoking marijuana: desire to show son a better example of life . 3) Identify and explain 2 to 4 explanations about relapse triggers. Explain 2 to 4 things about relapse.  Identify 2 to 4 differences and similarities between internal and eternal triggers associated with substance

## 2023-05-04 ENCOUNTER — APPOINTMENT (OUTPATIENT)
Dept: PSYCHIATRY | Age: 38
End: 2023-05-04
Payer: COMMERCIAL

## 2023-05-04 ENCOUNTER — HOSPITAL ENCOUNTER (OUTPATIENT)
Dept: PSYCHIATRY | Age: 38
Setting detail: THERAPIES SERIES
Discharge: HOME | End: 2023-05-04
Payer: COMMERCIAL

## 2023-05-04 NOTE — GROUP NOTE
612 [facility] Group Therapy Note      5/4/2023    Location:  [place]      Clients Presents: 9106, 9888, 1412, 1393, 1432, 1378, 1417    Clients Absent: 1437, 1404, 1411, 1424, 1337    Length of session: 1.5 hours    Group Note: OP    Group Type: Co-Ed    New members were welcomed and introduced. Norms and expectations of group were discussed. Content: Counselor presented a topic focused discussion on Substance abuse risk factors. Client identified Biological, psychological and social risk factors.      Manolo Valverde, [facility]  5/4/2023 11:39 AM    Co-Therapist: N/A      [facility] Individual Group Progress Note    Boston Bamberger  1985 5/4/2023    Notes on Client Progress in Group    Reason for Absence: DNS     Manolo Valverde, [address] [facility]  5/4/2023 11:54 AM    Co-Therapist: N/A

## 2023-05-08 ENCOUNTER — HOSPITAL ENCOUNTER (OUTPATIENT)
Dept: PSYCHIATRY | Age: 38
Setting detail: THERAPIES SERIES
End: 2023-05-08
Payer: COMMERCIAL

## 2023-05-09 ENCOUNTER — HOSPITAL ENCOUNTER (OUTPATIENT)
Dept: PSYCHIATRY | Age: 38
Setting detail: THERAPIES SERIES
Discharge: HOME | End: 2023-05-09
Payer: COMMERCIAL

## 2023-05-09 PROCEDURE — 90832 PSYTX W PT 30 MINUTES: CPT

## 2023-05-09 PROCEDURE — 80305 DRUG TEST PRSMV DIR OPT OBS: CPT

## 2023-05-10 NOTE — PROGRESS NOTES
612 [facility]        Individual  Progress Note    Location: [x] [place] [] [place]                   Patient Name: Radha Noland   : 1985     Case # :  [id number]  Therapist: MARCIAL Langford      1 hour      Goal    # 3      Objective   #    2             Dorcas Bran is a 44year old  male: never : son age 16 from previous relationship: son reside with mother: involved with [facility]; : Stacey Saurabh: charged with SHELLEY: indicated he was sentenced 3 days in [place], pending scheduled to complete the Weekend Intervention Program next week: 6097: license suspended: furthermore indicated prior involvement with Arely Pretty: SHELLEY: 2023: other prior charges: Driving Under Suspension: 2022, 2022: Driving without valid License: Burglary: dismissed. S: Ez Valdovinos still employed, sober, arrived focused, started new job: denies any using: expressed avoiding interacting with using peers and detaching from using. O: Denies any homicidal and suicidal ideation: denies any psychosis, oriented x 4                 A: reviewed alternative events and activities to enhance sobriety.              P: continue services           Electronically signed by MARCIAL Langford on  at 3:36 PM         Tayo Mancia, TIAN, MARCIAL
use in 90 days and Evaluation Date:6/9/2023 Code: C Continue TBD       On 3-30-23: Antonio Avila attended session, expressed history of having problems with being patient, slowing down,especially pertaining to toxic relationship: expressed he primarily involved with women that drink or use: which he mainly is not focused. 04/3/23: identify his current relationship is toxic, she use and he does not trust her: past brake ups: expressed plans to terminate relationship. 2.    Problem: Limited knowledge regarding disease concept and substance use. Goal:  Enhance knowledge and understanding regarding disease concept associated with substance use. Objectives:           1) Complete 2 to 4 assignments regarding biography of substance use, include onset, frequency, tolerance and amounts  and  in 90 days:  Evaluation Date: 6/9/2023 Code: Code: On 5-1-23:       Denies any drug use historically: last consumed alcohol: February 2023: onset of consuming alcohol: include beer and liquor: onset age 23 until ae 44: indicated his drinking has remained consistent: frequency consistent: reporting occasional drinking: one weekends: monthly: unsure of amounts, reporting it would vary. Work: Occupational: missed time at work: called off work due to legal charges, using work money due to court concerns and SHELLEY         Legal: charged with SHELLEY: indicated he was sentenced 3 days in half-way, pending scheduled to complete the [address] next week: March 16, 5238: license suspended: furthermore, indicated prior involvement with Rupali Wright: SHELLEY: February 2023: other prior charges: Driving Under Suspension: August 2022, February 2022: Driving without valid License: Juan Luisary: dismissed.         Financial: loss income due to court, , [address] [facility] program, fines, fees, probation, license and insurance consequences         Leisure: events, activities: Social: several social events involve

## 2023-05-11 ENCOUNTER — APPOINTMENT (OUTPATIENT)
Dept: PSYCHIATRY | Age: 38
End: 2023-05-11
Payer: COMMERCIAL

## 2023-05-11 ENCOUNTER — HOSPITAL ENCOUNTER (OUTPATIENT)
Dept: PSYCHIATRY | Age: 38
Setting detail: THERAPIES SERIES
Discharge: HOME | End: 2023-05-11
Payer: COMMERCIAL

## 2023-05-11 PROCEDURE — 90853 GROUP PSYCHOTHERAPY: CPT

## 2023-05-11 NOTE — GROUP NOTE
612 [facility] Group Therapy Note      5/11/2023    Location:  [place] [facility]      Clients Presents: 0750, [phone], [phone], 8723, [address], 9203, 1400, 8958, 1435, 1438      Clients Absent:     Length of session: 1.5 hours    Group Note: OP    Group Type: Co-Ed    New members were welcomed and introduced. Norms and expectations of group were discussed. Content: Understanding the Impact of Substance Use on Major Life Areas             MARCIAL Hickey  9/09/9112 6:56 PM        Co-Therapist: N/A      [facility] Individual Group Progress Note    Emigdio Osorio  1985 5/11/2023    Notes on Client Progress in [facility] 3 attended group, introduced himself and events leading to arrival: presented check in information: identified legal and financial consequences.            MARCIAL Hickey  7/95/1360 3:84 PM        Co-Therapist: N/A

## 2023-05-15 ENCOUNTER — APPOINTMENT (OUTPATIENT)
Dept: PSYCHIATRY | Age: 38
End: 2023-05-15
Payer: COMMERCIAL

## 2023-05-16 ENCOUNTER — HOSPITAL ENCOUNTER (OUTPATIENT)
Dept: PSYCHIATRY | Age: 38
Setting detail: THERAPIES SERIES
Discharge: HOME | End: 2023-05-16
Payer: COMMERCIAL

## 2023-05-16 PROCEDURE — 90834 PSYTX W PT 45 MINUTES: CPT

## 2023-05-18 ENCOUNTER — APPOINTMENT (OUTPATIENT)
Dept: PSYCHIATRY | Age: 38
End: 2023-05-18
Payer: COMMERCIAL

## 2023-05-18 ENCOUNTER — HOSPITAL ENCOUNTER (OUTPATIENT)
Dept: PSYCHIATRY | Age: 38
Setting detail: THERAPIES SERIES
Discharge: HOME | End: 2023-05-18
Payer: COMMERCIAL

## 2023-05-18 PROCEDURE — 90853 GROUP PSYCHOTHERAPY: CPT

## 2023-05-18 NOTE — GROUP NOTE
[facility] Group Therapy Note      5/18/2023    Location:  [employer]        Clients Presents: 2312, 1727, 0298 [address], 1400, 3784, 7964, 1430    Clients Absent: 1389, 1453, 9462     Length of session: 1.5 hours    Group Note: OP    Group Type: Co-Ed    New members were welcomed and introduced. Norms and expectations of group were discussed. Content: Understanding Dysfunctional Families: Addiction and [facility], [facility]-  7/96/7615 9:07 PM      Co-Therapist: N/A      [facility] Individual Group Progress Note    Debbie Jackson  1985 5/18/2023    Notes on Client Progress in [place] 3 attended group, introduced himself and events leading to arrival: presented check in information: discussed both parent abandoning him due to their substance use.         Matthew Graves [facility]-CS  0/55/1124 6:82 PM      Co-Therapist: N/A

## 2023-05-22 ENCOUNTER — APPOINTMENT (OUTPATIENT)
Dept: PSYCHIATRY | Age: 38
End: 2023-05-22
Payer: COMMERCIAL

## 2023-05-23 ENCOUNTER — HOSPITAL ENCOUNTER (OUTPATIENT)
Dept: PSYCHIATRY | Age: 38
Setting detail: THERAPIES SERIES
Discharge: HOME | End: 2023-05-23
Payer: COMMERCIAL

## 2023-05-24 NOTE — PROGRESS NOTES
05/07/22 0303   NIV Type   Equipment Type v60   Mode Bilevel   Mask Type   (under the nose)   Mask Size   (b)   Settings/Measurements   IPAP 16 cmH20   CPAP/EPAP 8 cmH2O   Rate Ordered 14   Resp 21   FiO2  50 %   Vt Exhaled 515 mL   Minute Volume 11 Liters   Mask Leak (lpm) 12 lpm   Comfort Level Good   Using Accessory Muscles No   SpO2 99   Patient Observation   Observations spo2 99% on 50% bipap [facility]           Location: [x] [place] [] [facility]        Treatment plan: Initial          Strengths: verbally motivated, employed, driving  privileges           Weakness/Limitations:  legal stressors            Service/Frequency/Duration: : Individual Session x 1 time weekly x 90 days, Standard Outpatient Group x 1 time weekly x 90 days, Urinalysis one time monthly x 90 days and one time weekly x 90 days A.A /  NA meetings              Diagnosis:          F10.20 Alcohol Use Disorder                           Level of Care:  Standard  Outpatient and Individual sessions         Problem: History of Substance use            Goal: Enhance personalized knowledge and insight associated with mood altering substances x 90 days     Objectives:              1) Remind 2 to 6  detrimental consequences in major life areas regarding substance  use in 90 days Evaluation Date: 6/9 2023 Code:       4-10-23: identify legal, social and financial, impact self esteem, consequences: discussed during his childhood: exposed and lacked basic needs: such as food, clothing, shelter,safety: associated with fathers drug use: mother would use when father would return to house: identified stress in relationships: spending money, involved in dysfunctional decisions: maturation issues. 2) Identify 4 to 8 psychological or physiological benefits /  gratitudes due to remaining substance free in 90 days: Evaluation Date: 6/9/2023 Code: C Continue TBD        On 4-17-23: recognizing the benefits of being sober: more productive at work, save money, improved health,: decreased legal concerns: discussed concerns that his son age 16: smoking marijuana: desire to show son a better example of life . 3) Identify and explain 2 to 4 explanations about relapse triggers. Explain 2 to 4 things about relapse.  Identify 2 to 4 differences and similarities between internal and eternal triggers associated with substance

## 2023-05-24 NOTE — PROGRESS NOTES
[facility]        Individual  Progress Note    Location: [x] [place] [] Angelique [facility]                   Patient Name: Molly Agee   : 1985     Case # :  [id number]  Therapist: MARCIAL Conley        1 hour        Goal    # 3      Objective   #    2              Grupo Mccoy is a 44year old  male: never : son age 16 from previous relationship: son reside with mother: involved with [facility]; : Eliezer Zarco: charged with SHELLEY: indicated he was sentenced 3 days in residential, pending scheduled to complete the Weekend Intervention Program next week: 6963: license suspended: furthermore indicated prior involvement with [employer]: SHELLEY: 2023: other prior charges: Driving Under Suspension: 2022, 2022: Driving without valid License: [place]: dismissed. S: Zeyad Ryanum arrived: still employed, however frustrated he had to stop working his job in [address]: expressed stressed about not having drivers license: plans to attend license class for points: still sober, avoiding toxic relationships. O: Denies any homicidal and suicidal ideation: denies any psychosis, oriented x 4                 A: reviewed healthy coping skills, importance of being sober and addressing legal issues.              P: continue services         Electronically signed by MARCIAL Conley on  at 2:45 PM       Tayo Ruby 83, MARCIAL OVERTON

## 2023-05-25 ENCOUNTER — APPOINTMENT (OUTPATIENT)
Dept: PSYCHIATRY | Age: 38
End: 2023-05-25
Payer: COMMERCIAL

## 2023-05-25 ENCOUNTER — HOSPITAL ENCOUNTER (OUTPATIENT)
Dept: PSYCHIATRY | Age: 38
Setting detail: THERAPIES SERIES
Discharge: HOME | End: 2023-05-25
Payer: COMMERCIAL

## 2023-05-25 PROCEDURE — 90853 GROUP PSYCHOTHERAPY: CPT

## 2023-05-25 NOTE — GROUP NOTE
612 [facility] Group Therapy Note      5/25/2023    Location:  [employer]      Clients Presents: 2947, 1400, 8958, 1435, 1438      Clients Absent: 1440, 1453, 9496      Length of session: 1.5 hours    Group Note: OP    Group Type: Co-Ed    New members were welcomed and introduced. Norms and expectations of group were discussed. Content: Understanding Triggers surrounding Holidays and Substance Use         MARCIAL Arrington  5/14/7274 4:14 PM      Co-Therapist: N/A      [facility] Individual [facility] Progress Note    Yara Eye  1985 5/25/2023    Notes on Client Progress in [facility] 3 attended group, introduced himself and events leading to arrival: presented check information: expressed plans to be sober and interact with family.            MARCIAL Arrington  7/94/0323 9:32 PM        Co-Therapist: N/A

## 2023-05-29 ENCOUNTER — APPOINTMENT (OUTPATIENT)
Dept: PSYCHIATRY | Age: 38
End: 2023-05-29
Payer: COMMERCIAL

## 2023-06-01 ENCOUNTER — APPOINTMENT (OUTPATIENT)
Dept: PSYCHIATRY | Age: 38
End: 2023-06-01
Payer: COMMERCIAL

## 2023-06-01 ENCOUNTER — HOSPITAL ENCOUNTER (OUTPATIENT)
Dept: PSYCHIATRY | Age: 38
Setting detail: THERAPIES SERIES
Discharge: HOME | End: 2023-06-01
Payer: COMMERCIAL

## 2023-06-01 PROCEDURE — 90853 GROUP PSYCHOTHERAPY: CPT

## 2023-06-01 NOTE — GROUP NOTE
612 [facility] Group Therapy Note      6/1/2023    Location:  [employer]        Clients Presents: [phone] [id number], 8237, 1474, 1435, 1438    Clients Absent: 9496     Length of session: 1.5 hours    Group Note: OP    Group Type: Co-Ed      New members were welcomed and introduced. Norms and expectations of group were discussed. Content: Understanding Internal and External Triggers: Relapse: Substance Use           Bryan Denver, [facility]-CS  5/1/7507 0:21 PM        Co-Therapist: N/A      [facility] Individual [facility] Progress Note    Sia Santiago  1985 6/1/2023    Notes on Client Progress in [facility] 3 attended group: introduced himself and events leading to arrival: presented check in information: identified internal feeling of being overwhelmed and isolating by increased working.          Bryan Denver, [facility]-CS  3/2/7418 0:76 PM        Co-Therapist: N/A

## 2023-06-06 ENCOUNTER — APPOINTMENT (OUTPATIENT)
Dept: PSYCHIATRY | Age: 38
End: 2023-06-06
Payer: COMMERCIAL

## 2023-06-06 ENCOUNTER — HOSPITAL ENCOUNTER (OUTPATIENT)
Dept: PSYCHIATRY | Age: 38
Setting detail: THERAPIES SERIES
Discharge: HOME | End: 2023-06-06
Payer: COMMERCIAL

## 2023-06-06 PROCEDURE — 90834 PSYTX W PT 45 MINUTES: CPT

## 2023-06-07 NOTE — PROGRESS NOTES
612 [facility]        Individual  Progress Note    Location: [x] [place] [] Jett Friedman                   Patient Name: Rika Middleton   : 1985     Case # :  [id number]  Therapist: MARCIAL Wadsworth      1 hour          Goal    # 3      Objective   #    2              Anish Sylvester is a 44year old  male: never : son age 16 from previous relationship: son reside with mother: involved with [facility]; : Enio Alexandre: charged with SHELLEY: indicated he was sentenced 3 days in [facility], pending scheduled to complete the Weekend Intervention Program next week: 3588: license suspended: furthermore indicated prior involvement with [employer]: SHELLEY: 2023: other prior charges: Driving Under Suspension: : Driving without valid License: [place]: dismissed. S: Rabia Castro arrived still employed, indicated he consumed 2 beers yesterday: indicated he discussed some childhood pain and hurt with mother regarding her using with his father and mother unable to stabilize his childhood mentally. O: Denies any homicidal and suicidal ideation: denies any psychosis, oriented x 4                 A: reviewed relapse triggers: importance of stability: self sabotage behavior.              P: continue services         Electronically signed by MARCIAL Wadsworth on  at 1:15 PM       Yesenia Drake, [place], LSW, MARCIAL
stop working his job in [address]: expressed stressed about not having drivers license: plans to attend license class for points: still sober, avoiding toxic relationships. On 6-7-23: arrived still employed, indicated he consumed 2 beers yesterday: indicated he discussed some childhood pain and hurt with mother regarding her using with his father and mother unable to stabilize his childhood mentally. 3) Journal, discuss, monitor  3 to 5 physiological, psychological, biological and mental alterations and coping skills of being sober: x 90 days  Evaluation Date: 6/9/2023       Code: On 3-23-23: Indicate still employed, report some cravings, but expressed being determined not to drink: report he is adjusting well not drinking: however therapist smelled alcohol on his person: indicated his life style mainly focused on drinking. Defer: Address legal stipulations                     Discharge Plan/Instructions: Demonstrate constructive motivation to successfully complete outpatient treatment, finalize stipulations for probation and legal system and develop healthy sobriety plan. Tammy Almazan / 1985 has participated in the treatment plan development outlined above on 6/7/2023.          Josephine Cope, [facility]-PAUL  9/8/9679/69:54 PM

## 2023-06-08 ENCOUNTER — APPOINTMENT (OUTPATIENT)
Dept: PSYCHIATRY | Age: 38
End: 2023-06-08
Payer: COMMERCIAL

## 2023-06-08 ENCOUNTER — HOSPITAL ENCOUNTER (OUTPATIENT)
Dept: PSYCHIATRY | Age: 38
Setting detail: THERAPIES SERIES
Discharge: HOME | End: 2023-06-08
Payer: COMMERCIAL

## 2023-06-08 PROCEDURE — 90853 GROUP PSYCHOTHERAPY: CPT

## 2023-06-08 NOTE — GROUP NOTE
612 [facility] Group Therapy Note      6/8/2023    Location:  [facility]        Clients Presents: 3290, 1440, 1473, 1470, 1435, 1469     Clients Absent: 9496, 8958, 1474      Length of session: 1.5 hours    Group Note: OP    Group Type: Co-Ed        New members were welcomed and introduced. Norms and expectations of group were discussed. Content: Post Acute Withdrawal: Relapse and Substance Use           MARCIAL Brown  2/6/6942 8:91 PM        Co-Therapist: N/A      [facility] Individual [facility] Progress Note    Consuelo Vazquez  1985 6/8/2023    Notes on Client Progress in Group        Enrrique Starks attended session, introduced himself and events leading to his arrival: presented check in information: struggled with long term memory, irregular sleep.            MARCIAL Brown  9/4/2308 9:87 PM        Co-Therapist: N/A

## 2023-06-13 ENCOUNTER — APPOINTMENT (OUTPATIENT)
Dept: PSYCHIATRY | Age: 38
End: 2023-06-13
Payer: COMMERCIAL

## 2023-06-14 NOTE — PROGRESS NOTES
612 [facility]        Individual  Progress Note    Location: [x] [place] [] Angelique [facility]arro                   Patient Name: Severo De Anda   : 1985     Case # :  [id number]  Therapist: MARCIAL Heaton        1 hour           Goal    # 3      Objective   #    2              Yvetta Shone is a 44year old  male: never : son age 16 from previous relationship: son reside with mother: involved with [facility], [place]; : Coby Vieira: charged with SHELLEY: indicated he was sentenced 3 days in [facility], pending scheduled to complete the Weekend Intervention Program next week: 2847: license suspended: furthermore indicated prior involvement with [employer]: SHELLEY: 2023: other prior charges: Driving Under Suspension: 2022, 2022: Driving without valid License: [place]glary: dismissed. S: Heidy Maldonado  arrived still employed, nervous about legal issues concerning his license, denies involved in illegal activity: discussed internal triggers and previous toxic relationships. O: Denies any homicidal and suicidal ideation: denies any psychosis, oriented x 4                 A: reviewed coping skills healthy boundaries, avoiding mixed messages and reviewing secondary consequences concerning relapse prevention.                  P: continue services          Electronically signed by MARCIAL Heaton on 9947 at 12:58 PM         Tayo Murphy 83, LSW, MARCIAL
alcohol   Continued use despite social/ interpersonal problems exacerbated or intensified by use or by alcohol: previous SHELLEY: continued to drink and drive            Recurrent use in Physically hazardous situations: SHELLEY x 2         Tolerance: increased 6 beers           2) Complete assignment on difference  between substance abuse, substance dependence and disease concept of substance use in 90 days  Evaluation Date: 6/9/2023 Code: On 4-24-24: discussed substance dependence: identify alcohol: report mainly impacted consequences poor decision making, relationship stressors, drinking until intoxicated, blackouts, legal and tolerance                 3)  Utilize, if needed case management services provided by [facility] to enhance abstaining from substance use Evaluation Date: 6/09/23 Code: C Continue TBD              3.    Problem: Limited experience and life regarding Relapse x 90 days           Goal: Identify and address the core dynamics and dilemmas that are perpetuating consequences and exacerbate relapses and triggers and in 90 days        Objectives:           1)  Complete and review with therapist at least 2 or more periods of being sober in 90 days Evaluation Date: 6/9/2023 Code: C Continue TBD                2) Enhance 4 to 8 healthy techniques and coping skills to empower a healthy  relapse prevention plan x 90 days  Evaluation Date: 6/9/2023 Code: On 5-10-23: Frankgagan Bustamante arrived focused, started new job: denies any using: expressed avoiding interacting with using peers and detaching from using. On 5-16-23: Sierra Bustamante arrived: still employed with new employer: discussed still residing with mother and younger brother: tends to feel obligated to assist relatives, mainly financial: expressed he has been helping family since childhood: meanwhile his personal goals and desires he tends to avoid, delay or not finalize.            On 5-23-23: Sierra Bustamante arrived, still employed, however frustrated he had to

## 2023-06-15 ENCOUNTER — APPOINTMENT (OUTPATIENT)
Dept: PSYCHIATRY | Age: 38
End: 2023-06-15
Payer: COMMERCIAL

## 2023-06-15 NOTE — GROUP NOTE
612 [facility] Group Therapy Note      6/15/2023    Location:  [facility]      Clients Presents: 1810,6329, 3592, 1411, 1470, 1474, 1469      Clients Absent: 9496, 8958, 1435       Length of session: 1.5 hours    Group Note: OP    Group Type: Co-Ed    New members were welcomed and introduced. Norms and expectations of group were discussed.       Content: Understanding Major Life Areas Impacted by Substance Use       MARCIAL Florez  9/00/6156 8:50 PM      Co-Therapist: N/A      [facility] Progress Note    Eula Pearce  1985  6/15/2023    Notes on Client Progress in [facility] 3 attended group, introduced himself and events leading to arrival: presented check in information: identified having SHELLEY, , work loss, attending Weekend Intervention Program: loss of job due to [employer] and not having license           MARCIAL Florez  3/46/1523 6:16 PM        Co-Therapist: N/A

## 2023-06-20 ENCOUNTER — APPOINTMENT (OUTPATIENT)
Dept: PSYCHIATRY | Age: 38
End: 2023-06-20
Payer: COMMERCIAL

## 2023-06-20 PROCEDURE — 90834 PSYTX W PT 45 MINUTES: CPT

## 2023-06-20 NOTE — PROGRESS NOTES
612 [facility]        Individual  Progress Note    Location: [x] [place] [] [place]                   Patient Name: Nguyen Alfaro   : 1985     Case # :  [id number]  Therapist: MARCIAL Murcia          1 hour           Goal    # 3      Objective   #    1              Roslyn Potts is a 44year old  male: never : son age 16 from previous relationship: son reside with mother: involved with [facility]; : Efe Sanders: charged with SHELLEY: indicated he was sentenced 3 days in residential, pending scheduled to complete the Weekend Intervention Program next week: 9362: license suspended: furthermore indicated prior involvement with [place] [employer]: SHELLEY: 2023: other prior charges: Driving Under Suspension: 2022, 2022: Driving without valid License: Burglary: dismissed. S: Manohar Lance  indicated he still employed: discussed currently since being in legal system and in treatment he indicated longest time being sober: recognized lifestyle. O: Denies any homicidal and suicidal ideation: denies any psychosis, oriented x 4                 A: reviewed intervals of being sober: discussed failed casual drinking, being sober longest while in treatment.                    P: continue services              Electronically signed by MARCIAL Murcia on  at 6:50 PM         Tayo Mcintosh 83, LSW, MARCILA
meanwhile his personal goals and desires he tends to avoid, delay or not finalize. On 5-23-23: Nancie Banda arrived, still employed, however frustrated he had to stop working his job in [address]: expressed stressed about not having drivers license: plans to attend license class for points: still sober, avoiding toxic relationships. On 6-7-23: arrived still employed, indicated he consumed 2 beers yesterday: indicated he discussed some childhood pain and hurt with mother regarding her using with his father and mother unable to stabilize his childhood mentally. [id number] Troncoso arrived still employed, nervous about legal issues concerning his license, denies involved in illegal activity: discussed internal triggers and previous toxic relationships. 3) Journal, discuss, monitor  3 to 5 physiological, psychological, biological and mental alterations and coping skills of being sober: x 90 days  Evaluation Date: 6/9/2023       Code: On 3-23-23: Indicate still employed, report some cravings, but expressed being determined not to drink: report he is adjusting well not drinking: however therapist smelled alcohol on his person: indicated his life style mainly focused on drinking. Defer: Address legal stipulations                     Discharge Plan/Instructions: Demonstrate constructive motivation to successfully complete outpatient treatment, finalize stipulations for probation and legal system and develop healthy sobriety plan. Jb Julien / 1985 has participated in the treatment plan development outlined above on 6/20/2023.          Debbie Curtis [facility]-PAUL  4/54/3065/8:67 PM

## 2023-06-22 ENCOUNTER — APPOINTMENT (OUTPATIENT)
Dept: PSYCHIATRY | Age: 38
End: 2023-06-22
Payer: COMMERCIAL

## 2023-06-22 PROCEDURE — 90853 GROUP PSYCHOTHERAPY: CPT

## 2023-06-27 ENCOUNTER — APPOINTMENT (OUTPATIENT)
Dept: PSYCHIATRY | Age: 38
End: 2023-06-27
Payer: COMMERCIAL

## 2023-06-27 PROCEDURE — 90832 PSYTX W PT 30 MINUTES: CPT

## 2023-06-27 PROCEDURE — 90834 PSYTX W PT 45 MINUTES: CPT

## 2023-06-27 PROCEDURE — 80305 DRUG TEST PRSMV DIR OPT OBS: CPT

## 2023-06-29 ENCOUNTER — APPOINTMENT (OUTPATIENT)
Dept: PSYCHIATRY | Age: 38
End: 2023-06-29
Payer: COMMERCIAL

## 2023-06-29 PROCEDURE — 90853 GROUP PSYCHOTHERAPY: CPT

## 2023-07-04 ENCOUNTER — APPOINTMENT (OUTPATIENT)
Dept: PSYCHIATRY | Age: 38
End: 2023-07-04
Payer: COMMERCIAL

## 2023-07-06 ENCOUNTER — HOSPITAL ENCOUNTER (OUTPATIENT)
Dept: PSYCHIATRY | Age: 38
Setting detail: THERAPIES SERIES
Discharge: HOME | End: 2023-07-06
Payer: COMMERCIAL

## 2023-07-06 PROCEDURE — 90853 GROUP PSYCHOTHERAPY: CPT

## 2023-07-06 NOTE — GROUP NOTE
500 [facility] Group Therapy Note      7/6/2023    Location:  [employer]      Clients Presents: 0967, 2950, 0186, 1411, 1470, 1435    Clients Absent: 1474, 1479, 1469       Length of session: 1.5 hours    Group Note: OP    Group Type: Co-Ed      New members were welcomed and introduced. Norms and expectations of group were discussed. Content: Understanding Personality Disorder : Substance Use: Relapse         MARCIAL Cormier  5/8/5624 1:67 PM      Co-Therapist: N/A      [facility] Individual [facility] Progress Note    Anabell Kingsley  1985 7/6/2023    Notes on Client Progress in [address] attended group, introduced himself and events leading to arrival: presented check in information: identified Obsessive Compulsive Behavior.           MARCIAL Cormier  0/7/5324 5:43 PM        Co-Therapist: N/A

## 2023-07-11 ENCOUNTER — APPOINTMENT (OUTPATIENT)
Dept: PSYCHIATRY | Age: 38
End: 2023-07-11
Payer: COMMERCIAL

## 2023-07-11 ENCOUNTER — HOSPITAL ENCOUNTER (OUTPATIENT)
Dept: PSYCHIATRY | Age: 38
Setting detail: THERAPIES SERIES
Discharge: HOME | End: 2023-07-11
Payer: COMMERCIAL

## 2023-07-11 PROCEDURE — 90834 PSYTX W PT 45 MINUTES: CPT

## 2023-07-12 NOTE — PROGRESS NOTES
500 [facility]        Individual  Progress Note    Location: [x] [place] [] Pierre Kenny                   Patient Name: Valentina Read   : 1985     Case # :  [id number]  Therapist: MARCIAL Odom        1 hour           Goal    # 3      Objective   #    2              Bertha Holden is a 44year old  male: never : son age 16 from previous relationship: son reside with mother: involved with [facility]; : Milka Quinones: charged with SHELLEY: indicated he was sentenced 3 days in correction, pending scheduled to complete the Weekend Intervention Program next week: 3463: license suspended: furthermore indicated [facility]: [facility]: 2023: other prior charges: Driving Under Suspension: 2022, 2022: Driving without valid License: Burglary: dismissed. S: Hamilton Schaumann arrived his new job continues to be very stressed: identifies his relapse is toxic relationships, interacting in using activities and self defeating behaviors.                O: Denies any homicidal and suicidal ideation: denies any psychosis, oriented x 4                 A: reviewed  triggers, coping skills                P: continue services            Electronically signed by MARCIAL Odom on  at 1:18 PM           Priscilla Manriquez, [address] [place] [address], [facility] UnitMARCIAL

## 2023-07-13 ENCOUNTER — APPOINTMENT (OUTPATIENT)
Dept: PSYCHIATRY | Age: 38
End: 2023-07-13
Payer: COMMERCIAL

## 2023-07-18 ENCOUNTER — APPOINTMENT (OUTPATIENT)
Dept: PSYCHIATRY | Age: 38
End: 2023-07-18
Payer: COMMERCIAL

## 2023-07-20 ENCOUNTER — APPOINTMENT (OUTPATIENT)
Dept: PSYCHIATRY | Age: 38
End: 2023-07-20
Payer: COMMERCIAL

## 2023-07-25 ENCOUNTER — APPOINTMENT (OUTPATIENT)
Dept: PSYCHIATRY | Age: 38
End: 2023-07-25
Payer: COMMERCIAL

## 2023-07-27 ENCOUNTER — APPOINTMENT (OUTPATIENT)
Dept: PSYCHIATRY | Age: 38
End: 2023-07-27
Payer: COMMERCIAL

## 2023-08-01 ENCOUNTER — HOSPITAL ENCOUNTER (OUTPATIENT)
Dept: PSYCHIATRY | Age: 38
Setting detail: THERAPIES SERIES
Discharge: HOME | End: 2023-08-01

## 2023-08-02 NOTE — GROUP NOTE
500 [facility] Group Therapy Note      8/2/2023    Location:  [place]        Clients Presents: 7609, 1344    Clients Absent: 1283, 1478, 1422      Length of session: 1.5 hours    Group Note: OP    Group Type: Co-Ed    New members were welcomed and introduced. Norms and expectations of group were discussed.         Content: Understanding Impact on Major Life Areas and Substance Use       MARCIAL Porter  0/9/6760 3:54 PM      Co-Therapist: N/A      [facility] Individual Group Progress Note    Ramya Kim  1985 8/2/2023    Notes on Client Progress in Group        Case management         MARCIAL Porter  1/5/4388 3:05 PM        Co-Therapist: N/A

## 2023-08-02 NOTE — PROGRESS NOTES
500 [facility] Discharge Summary      Zenon Mijares  1985  Case # [id number]    Location: [x] [place] [] [facility]      Admission Date:  March 9, 2023         Date of last service: 8/2/2023      Therapist: MARCIAL Zazueta         Presenting Problem        Robbie Velasquez is a 44year old  male: never : son age 16 from previous relationship: son reside with mother: involved with [facility]; : Letty Lauren: charged with SHELLEY: indicated he was sentenced 3 days in long term, pending scheduled to complete the Weekend Intervention Program next week: March 16, 1757: license suspended: furthermore indicated prior involvement with [employer]: SHELLEY: February 2023: other prior charges: Driving Under Suspension: August 2022, February 2022: Driving without valid License: Burglary: dismissed. Last drug screen:  June 27, 2023       Results:  Negative         Diagnosis: F10.20 Other and unspecified alcohol dependence/unspecified drinking behavior         Service:   assessment,   Individual  , Group assigned  , Urinalysis  , AA/NA  , Case Management  , Crisis Management  , and Urinalysis            Level of care at ADMISSIONS: 1 Outpatient Services      Level of care at DISCHARGE : 1 Outpatient Services          Client's Outcomes Treatment: (Review of participation, successes, insight, etc.)       Nereida Laech has successfully completed goals and objectives for Individual and Standard Outpatient Group. Service History Appointments:         Scheduled 30     Kept 30     Cancelled  0       Did not show  0          Discharge Code: B completed treatment program        Reason for discharge:         Nereida Leach successfully completed goals and objectives for SOP group and Individual sessions.           Recommendations/Referrals:       Finalize legal stipulations          Upon involuntary termination from service, client has received Client Rights as to their right to file an